# Patient Record
Sex: MALE | HISPANIC OR LATINO | Employment: FULL TIME | URBAN - METROPOLITAN AREA
[De-identification: names, ages, dates, MRNs, and addresses within clinical notes are randomized per-mention and may not be internally consistent; named-entity substitution may affect disease eponyms.]

---

## 2017-09-21 ENCOUNTER — OFFICE VISIT (OUTPATIENT)
Dept: URGENT CARE | Facility: PHYSICIAN GROUP | Age: 22
End: 2017-09-21
Payer: COMMERCIAL

## 2017-09-21 VITALS
BODY MASS INDEX: 25.27 KG/M2 | HEART RATE: 90 BPM | WEIGHT: 161 LBS | DIASTOLIC BLOOD PRESSURE: 56 MMHG | SYSTOLIC BLOOD PRESSURE: 100 MMHG | TEMPERATURE: 98.3 F | RESPIRATION RATE: 14 BRPM | OXYGEN SATURATION: 95 % | HEIGHT: 67 IN

## 2017-09-21 DIAGNOSIS — R19.7 DIARRHEA, UNSPECIFIED TYPE: ICD-10-CM

## 2017-09-21 DIAGNOSIS — K64.9 HEMORRHOIDS, UNSPECIFIED HEMORRHOID TYPE: ICD-10-CM

## 2017-09-21 PROCEDURE — 99203 OFFICE O/P NEW LOW 30 MIN: CPT | Performed by: FAMILY MEDICINE

## 2017-09-21 RX ORDER — ONDANSETRON 4 MG/1
4 TABLET, FILM COATED ORAL EVERY 4 HOURS PRN
Qty: 20 TAB | Refills: 0 | Status: SHIPPED | OUTPATIENT
Start: 2017-09-21 | End: 2019-06-25

## 2017-09-21 RX ORDER — ONDANSETRON 4 MG/1
4 TABLET, ORALLY DISINTEGRATING ORAL ONCE
Status: COMPLETED | OUTPATIENT
Start: 2017-09-21 | End: 2017-09-21

## 2017-09-21 RX ADMIN — ONDANSETRON 4 MG: 4 TABLET, ORALLY DISINTEGRATING ORAL at 10:11

## 2017-09-21 ASSESSMENT — ENCOUNTER SYMPTOMS
VOMITING: 0
EYE DISCHARGE: 0
BLOOD IN STOOL: 0
HEADACHES: 0
SHORTNESS OF BREATH: 0
CHILLS: 0
ABDOMINAL PAIN: 1
CONSTIPATION: 0
SORE THROAT: 0
WEIGHT LOSS: 0
COUGH: 0
SENSORY CHANGE: 0
WHEEZING: 0
FEVER: 0
NAUSEA: 1
PALPITATIONS: 0
DIARRHEA: 1

## 2017-09-21 NOTE — PATIENT INSTRUCTIONS
Hemorrhoids  Hemorrhoids are swollen veins around the rectum or anus. There are two types of hemorrhoids:   · Internal hemorrhoids. These occur in the veins just inside the rectum. They may poke through to the outside and become irritated and painful.  · External hemorrhoids. These occur in the veins outside the anus and can be felt as a painful swelling or hard lump near the anus.  CAUSES  · Pregnancy.    · Obesity.    · Constipation or diarrhea.    · Straining to have a bowel movement.    · Sitting for long periods on the toilet.  · Heavy lifting or other activity that caused you to strain.  · Anal intercourse.  SYMPTOMS   · Pain.    · Anal itching or irritation.    · Rectal bleeding.    · Fecal leakage.    · Anal swelling.    · One or more lumps around the anus.    DIAGNOSIS   Your caregiver may be able to diagnose hemorrhoids by visual examination. Other examinations or tests that may be performed include:   · Examination of the rectal area with a gloved hand (digital rectal exam).    · Examination of anal canal using a small tube (scope).    · A blood test if you have lost a significant amount of blood.  · A test to look inside the colon (sigmoidoscopy or colonoscopy).  TREATMENT  Most hemorrhoids can be treated at home. However, if symptoms do not seem to be getting better or if you have a lot of rectal bleeding, your caregiver may perform a procedure to help make the hemorrhoids get smaller or remove them completely. Possible treatments include:   · Placing a rubber band at the base of the hemorrhoid to cut off the circulation (rubber band ligation).    · Injecting a chemical to shrink the hemorrhoid (sclerotherapy).    · Using a tool to burn the hemorrhoid (infrared light therapy).    · Surgically removing the hemorrhoid (hemorrhoidectomy).    · Stapling the hemorrhoid to block blood flow to the tissue (hemorrhoid stapling).    HOME CARE INSTRUCTIONS   · Eat foods with fiber, such as whole grains, beans,  nuts, fruits, and vegetables. Ask your doctor about taking products with added fiber in them (fiber supplements).  · Increase fluid intake. Drink enough water and fluids to keep your urine clear or pale yellow.    · Exercise regularly.    · Go to the bathroom when you have the urge to have a bowel movement. Do not wait.    · Avoid straining to have bowel movements.    · Keep the anal area dry and clean. Use wet toilet paper or moist towelettes after a bowel movement.    · Medicated creams and suppositories may be used or applied as directed.    · Only take over-the-counter or prescription medicines as directed by your caregiver.    · Take warm sitz baths for 15-20 minutes, 3-4 times a day to ease pain and discomfort.    · Place ice packs on the hemorrhoids if they are tender and swollen. Using ice packs between sitz baths may be helpful.    ¨ Put ice in a plastic bag.    ¨ Place a towel between your skin and the bag.    ¨ Leave the ice on for 15-20 minutes, 3-4 times a day.    · Do not use a donut-shaped pillow or sit on the toilet for long periods. This increases blood pooling and pain.    SEEK MEDICAL CARE IF:  · You have increasing pain and swelling that is not controlled by treatment or medicine.  · You have uncontrolled bleeding.  · You have difficulty or you are unable to have a bowel movement.  · You have pain or inflammation outside the area of the hemorrhoids.  MAKE SURE YOU:  · Understand these instructions.  · Will watch your condition.  · Will get help right away if you are not doing well or get worse.     This information is not intended to replace advice given to you by your health care provider. Make sure you discuss any questions you have with your health care provider.     Document Released: 12/15/2001 Document Revised: 12/04/2013 Document Reviewed: 10/22/2013  [employer] Patient Education ©2016 [employer]    1- H-preparation cream    Diarrhea  Diarrhea is frequent loose and watery  bowel movements. It can cause you to feel weak and dehydrated. Dehydration can cause you to become tired and thirsty, have a dry mouth, and have decreased urination that often is dark yellow. Diarrhea is a sign of another problem, most often an infection that will not last long. In most cases, diarrhea typically lasts 2-3 days. However, it can last longer if it is a sign of something more serious. It is important to treat your diarrhea as directed by your caregiver to lessen or prevent future episodes of diarrhea.  CAUSES   Some common causes include:  · Gastrointestinal infections caused by viruses, bacteria, or parasites.  · Food poisoning or food allergies.  · Certain medicines, such as antibiotics, chemotherapy, and laxatives.  · Artificial sweeteners and fructose.  · Digestive disorders.  HOME CARE INSTRUCTIONS  · Ensure adequate fluid intake (hydration): Have 1 cup (8 oz) of fluid for each diarrhea episode. Avoid fluids that contain simple sugars or sports drinks, fruit juices, whole milk products, and sodas. Your urine should be clear or pale yellow if you are drinking enough fluids. Hydrate with an oral rehydration solution that you can purchase at pharmacies, retail stores, and online. You can prepare an oral rehydration solution at home by mixing the following ingredients together:  ¨  - tsp table salt.  ¨ ¾ tsp baking soda.  ¨  tsp salt substitute containing potassium chloride.  ¨ 1  tablespoons sugar.  ¨ 1 L (34 oz) of water.  · Certain foods and beverages may increase the speed at which food moves through the gastrointestinal (GI) tract. These foods and beverages should be avoided and include:  ¨ Caffeinated and alcoholic beverages.  ¨ High-fiber foods, such as raw fruits and vegetables, nuts, seeds, and whole grain breads and cereals.  ¨ Foods and beverages sweetened with sugar alcohols, such as xylitol, sorbitol, and mannitol.  · Some foods may be well tolerated and may help thicken stool  including:  ¨ Starchy foods, such as rice, toast, pasta, low-sugar cereal, oatmeal, grits, baked potatoes, crackers, and bagels.  ¨ Bananas.  ¨ Applesauce.  · Add probiotic-rich foods to help increase healthy bacteria in the GI tract, such as yogurt and fermented milk products.  · Wash your hands well after each diarrhea episode.  · Only take over-the-counter or prescription medicines as directed by your caregiver.  · Take a warm bath to relieve any burning or pain from frequent diarrhea episodes.  SEEK IMMEDIATE MEDICAL CARE IF:   · You are unable to keep fluids down.  · You have persistent vomiting.  · You have blood in your stool, or your stools are black and tarry.  · You do not urinate in 6-8 hours, or there is only a small amount of very dark urine.  · You have abdominal pain that increases or localizes.  · You have weakness, dizziness, confusion, or light-headedness.  · You have a severe headache.  · Your diarrhea gets worse or does not get better.  · You have a fever or persistent symptoms for more than 2-3 days.  · You have a fever and your symptoms suddenly get worse.  MAKE SURE YOU:   · Understand these instructions.  · Will watch your condition.  · Will get help right away if you are not doing well or get worse.     This information is not intended to replace advice given to you by your health care provider. Make sure you discuss any questions you have with your health care provider.     Document Released: 12/08/2003 Document Revised: 01/08/2016 Document Reviewed: 08/25/2013  [employer] Interactive Patient Education ©2016 [employer]

## 2017-10-02 ENCOUNTER — OFFICE VISIT (OUTPATIENT)
Dept: MEDICAL GROUP | Facility: PHYSICIAN GROUP | Age: 22
End: 2017-10-02
Payer: COMMERCIAL

## 2017-10-02 VITALS
TEMPERATURE: 98.4 F | SYSTOLIC BLOOD PRESSURE: 110 MMHG | HEART RATE: 68 BPM | HEIGHT: 70 IN | RESPIRATION RATE: 16 BRPM | OXYGEN SATURATION: 98 % | WEIGHT: 159 LBS | BODY MASS INDEX: 22.76 KG/M2 | DIASTOLIC BLOOD PRESSURE: 70 MMHG

## 2017-10-02 DIAGNOSIS — R19.7 DIARRHEA, UNSPECIFIED TYPE: ICD-10-CM

## 2017-10-02 DIAGNOSIS — Z76.89 ENCOUNTER TO ESTABLISH CARE WITH NEW DOCTOR: ICD-10-CM

## 2017-10-02 PROBLEM — K64.9 HEMORRHOIDS: Status: RESOLVED | Noted: 2017-09-21 | Resolved: 2017-10-02

## 2017-10-02 PROCEDURE — 99214 OFFICE O/P EST MOD 30 MIN: CPT | Performed by: NURSE PRACTITIONER

## 2017-10-02 ASSESSMENT — PATIENT HEALTH QUESTIONNAIRE - PHQ9: CLINICAL INTERPRETATION OF PHQ2 SCORE: 0

## 2017-10-02 NOTE — ASSESSMENT & PLAN NOTE
Was seen in urgent care last week for diarrhea.  Reports that it is an issue that comes and goes every once in a while.  He hasn't been able to relate it to a specific trigger.  This episode has resolved completely, and he was encouraged to reach out if it recurs.

## 2017-10-02 NOTE — PROGRESS NOTES
Chief Complaint   Patient presents with   • Establish Care       HISTORY OF PRESENT ILLNESS: Patient is a 21 y.o. male new patient who presents today to discuss the following issues:    Encounter to establish care with new doctor  Is here to establish with a new primary care provider.  Was previously seen by Cheli Palafox.       Diarrhea  Was seen in urgent care last week for diarrhea.  Reports that it is an issue that comes and goes every once in a while.  He hasn't been able to relate it to a specific trigger.  This episode has resolved completely, and he was encouraged to reach out if it recurs.      Patient Active Problem List    Diagnosis Date Noted   • Encounter to establish care with new doctor 10/02/2017   • Diarrhea 09/21/2017       Allergies:Review of patient's allergies indicates no known allergies.    Current Outpatient Prescriptions   Medication Sig Dispense Refill   • ondansetron (ZOFRAN) 4 MG Tab tablet Take 1 Tab by mouth every four hours as needed for Nausea/Vomiting. 20 Tab 0     No current facility-administered medications for this visit.        Social History   Substance Use Topics   • Smoking status: Never Smoker   • Smokeless tobacco: Never Used   • Alcohol use Yes      Comment: rarely       Family Status   Relation Status   • Mother Alive   • Father Alive   • Neg Hx      Family History   Problem Relation Age of Onset   • Other Mother      heart murmur   • Cancer Neg Hx    • Diabetes Neg Hx    • Heart Attack Neg Hx    • Heart Disease Neg Hx    • Stroke Neg Hx        Review of Systems:   Constitutional: Negative for fever, chills, weight loss and malaise/fatigue.   HENT: Negative for ear pain, nosebleeds, congestion, sore throat and neck pain.    Eyes: Negative for blurred vision.   Respiratory: Negative for cough, sputum production, shortness of breath and wheezing.    Cardiovascular: Negative for chest pain, palpitations, orthopnea and leg swelling.   Gastrointestinal: Negative for  "heartburn, nausea, vomiting and abdominal pain.   Genitourinary: Negative for dysuria, urgency and frequency.   Musculoskeletal: Negative for myalgias, joint pain, and back pain.  Skin: Negative for rash and itching.   Neurological: Negative for dizziness, tingling, tremors, sensory change, focal weakness and headaches.   Endo/Heme/Allergies: Does not bruise/bleed easily.   Psychiatric/Behavioral: Negative for depression, suicidal ideas and memory loss.  The patient is not nervous/anxious and does not have insomnia.    All other systems reviewed and are negative except as in HPI.    Exam:  Blood pressure 110/70, pulse 68, temperature 36.9 °C (98.4 °F), resp. rate 16, height 1.778 m (5' 10\"), weight 72.1 kg (159 lb), SpO2 98 %.  General:  Well nourished, well developed male in NAD  Head: Grossly normal.  Neck: Supple without JVD or bruit. Thyroid is not enlarged.  Pulmonary: Clear to ausculation. Normal effort. No rales, ronchi, or wheezing.  Cardiovascular: Regular rate and rhythm without murmur.   Extremities: No clubbing, cyanosis, or edema.  Skin: Intact with no obvious rashes or lesions.  Neuro: Grossly intact.  Psych: Alert and oriented x 3.  Mood and affect appropriate.    Medical decision-making and discussion: Malachi is here to establish with a new primary care provider.  We reviewed his past medical history and discussed his current medications.  He will monitor symptoms, and he will consider getting a flu shot after his trip to [place]. He will sign a records release for his previous provider, he will sign up with [facility], and he will plan to follow-up here as needed.        Assessment/Plan:  1. Encounter to establish care with new doctor     2. Diarrhea, unspecified type         Return if symptoms worsen or fail to improve.    Please note that this dictation was created using voice recognition software. I have made every reasonable attempt to correct obvious errors, but I expect that there are errors " of grammar and possibly content that I did not discover before finalizing the note.

## 2019-06-25 ENCOUNTER — OFFICE VISIT (OUTPATIENT)
Dept: MEDICAL GROUP | Facility: PHYSICIAN GROUP | Age: 24
End: 2019-06-25
Payer: COMMERCIAL

## 2019-06-25 ENCOUNTER — HOSPITAL ENCOUNTER (OUTPATIENT)
Dept: LAB | Facility: MEDICAL CENTER | Age: 24
End: 2019-06-25
Attending: NURSE PRACTITIONER
Payer: COMMERCIAL

## 2019-06-25 VITALS
WEIGHT: 160 LBS | TEMPERATURE: 98.4 F | HEIGHT: 67 IN | SYSTOLIC BLOOD PRESSURE: 110 MMHG | HEART RATE: 57 BPM | OXYGEN SATURATION: 97 % | DIASTOLIC BLOOD PRESSURE: 72 MMHG | RESPIRATION RATE: 16 BRPM | BODY MASS INDEX: 25.11 KG/M2

## 2019-06-25 DIAGNOSIS — F43.21 GRIEF: ICD-10-CM

## 2019-06-25 DIAGNOSIS — A64 STI (SEXUALLY TRANSMITTED INFECTION): ICD-10-CM

## 2019-06-25 DIAGNOSIS — Z00.00 ENCOUNTER FOR WELLNESS EXAMINATION: ICD-10-CM

## 2019-06-25 DIAGNOSIS — Z23 NEED FOR VACCINATION: ICD-10-CM

## 2019-06-25 PROBLEM — R19.7 DIARRHEA: Status: RESOLVED | Noted: 2017-09-21 | Resolved: 2019-06-25

## 2019-06-25 PROBLEM — Z76.89 ENCOUNTER TO ESTABLISH CARE WITH NEW DOCTOR: Status: RESOLVED | Noted: 2017-10-02 | Resolved: 2019-06-25

## 2019-06-25 LAB
25(OH)D3 SERPL-MCNC: 18 NG/ML (ref 30–100)
ALBUMIN SERPL BCP-MCNC: 4.5 G/DL (ref 3.2–4.9)
ALBUMIN/GLOB SERPL: 1.7 G/DL
ALP SERPL-CCNC: 67 U/L (ref 30–99)
ALT SERPL-CCNC: 19 U/L (ref 2–50)
ANION GAP SERPL CALC-SCNC: 8 MMOL/L (ref 0–11.9)
AST SERPL-CCNC: 21 U/L (ref 12–45)
BASOPHILS # BLD AUTO: 0.6 % (ref 0–1.8)
BASOPHILS # BLD: 0.04 K/UL (ref 0–0.12)
BILIRUB SERPL-MCNC: 0.7 MG/DL (ref 0.1–1.5)
BUN SERPL-MCNC: 14 MG/DL (ref 8–22)
C TRACH DNA SPEC QL NAA+PROBE: NEGATIVE
CALCIUM SERPL-MCNC: 9.7 MG/DL (ref 8.5–10.5)
CHLORIDE SERPL-SCNC: 106 MMOL/L (ref 96–112)
CHOLEST SERPL-MCNC: 140 MG/DL (ref 100–199)
CO2 SERPL-SCNC: 23 MMOL/L (ref 20–33)
CREAT SERPL-MCNC: 0.91 MG/DL (ref 0.5–1.4)
EOSINOPHIL # BLD AUTO: 0.17 K/UL (ref 0–0.51)
EOSINOPHIL NFR BLD: 2.7 % (ref 0–6.9)
ERYTHROCYTE [DISTWIDTH] IN BLOOD BY AUTOMATED COUNT: 43.1 FL (ref 35.9–50)
FASTING STATUS PATIENT QL REPORTED: NORMAL
GLOBULIN SER CALC-MCNC: 2.6 G/DL (ref 1.9–3.5)
GLUCOSE SERPL-MCNC: 98 MG/DL (ref 65–99)
HBV SURFACE AG SER QL: NEGATIVE
HCT VFR BLD AUTO: 45.1 % (ref 42–52)
HDLC SERPL-MCNC: 42 MG/DL
HGB BLD-MCNC: 15.4 G/DL (ref 14–18)
HIV 1+2 AB+HIV1 P24 AG SERPL QL IA: NON REACTIVE
IMM GRANULOCYTES # BLD AUTO: 0.02 K/UL (ref 0–0.11)
IMM GRANULOCYTES NFR BLD AUTO: 0.3 % (ref 0–0.9)
LDLC SERPL CALC-MCNC: 71 MG/DL
LYMPHOCYTES # BLD AUTO: 1.71 K/UL (ref 1–4.8)
LYMPHOCYTES NFR BLD: 26.8 % (ref 22–41)
MCH RBC QN AUTO: 31.2 PG (ref 27–33)
MCHC RBC AUTO-ENTMCNC: 34.1 G/DL (ref 33.7–35.3)
MCV RBC AUTO: 91.5 FL (ref 81.4–97.8)
MONOCYTES # BLD AUTO: 0.52 K/UL (ref 0–0.85)
MONOCYTES NFR BLD AUTO: 8.1 % (ref 0–13.4)
N GONORRHOEA DNA SPEC QL NAA+PROBE: NEGATIVE
NEUTROPHILS # BLD AUTO: 3.93 K/UL (ref 1.82–7.42)
NEUTROPHILS NFR BLD: 61.5 % (ref 44–72)
NRBC # BLD AUTO: 0 K/UL
NRBC BLD-RTO: 0 /100 WBC
PLATELET # BLD AUTO: 290 K/UL (ref 164–446)
PMV BLD AUTO: 9.5 FL (ref 9–12.9)
POTASSIUM SERPL-SCNC: 4 MMOL/L (ref 3.6–5.5)
PROT SERPL-MCNC: 7.1 G/DL (ref 6–8.2)
RBC # BLD AUTO: 4.93 M/UL (ref 4.7–6.1)
SODIUM SERPL-SCNC: 137 MMOL/L (ref 135–145)
SPECIMEN SOURCE: NORMAL
TREPONEMA PALLIDUM IGG+IGM AB [PRESENCE] IN SERUM OR PLASMA BY IMMUNOASSAY: NON REACTIVE
TRIGL SERPL-MCNC: 135 MG/DL (ref 0–149)
WBC # BLD AUTO: 6.4 K/UL (ref 4.8–10.8)

## 2019-06-25 PROCEDURE — 86780 TREPONEMA PALLIDUM: CPT

## 2019-06-25 PROCEDURE — 87591 N.GONORRHOEAE DNA AMP PROB: CPT

## 2019-06-25 PROCEDURE — 80061 LIPID PANEL: CPT

## 2019-06-25 PROCEDURE — 85025 COMPLETE CBC W/AUTO DIFF WBC: CPT

## 2019-06-25 PROCEDURE — 36415 COLL VENOUS BLD VENIPUNCTURE: CPT

## 2019-06-25 PROCEDURE — 82306 VITAMIN D 25 HYDROXY: CPT

## 2019-06-25 PROCEDURE — 99395 PREV VISIT EST AGE 18-39: CPT | Mod: 25 | Performed by: NURSE PRACTITIONER

## 2019-06-25 PROCEDURE — 80053 COMPREHEN METABOLIC PANEL: CPT

## 2019-06-25 PROCEDURE — 90715 TDAP VACCINE 7 YRS/> IM: CPT | Performed by: NURSE PRACTITIONER

## 2019-06-25 PROCEDURE — 87340 HEPATITIS B SURFACE AG IA: CPT

## 2019-06-25 PROCEDURE — 87491 CHLMYD TRACH DNA AMP PROBE: CPT

## 2019-06-25 PROCEDURE — 87389 HIV-1 AG W/HIV-1&-2 AB AG IA: CPT

## 2019-06-25 PROCEDURE — 90471 IMMUNIZATION ADMIN: CPT | Performed by: NURSE PRACTITIONER

## 2019-06-25 ASSESSMENT — PATIENT HEALTH QUESTIONNAIRE - PHQ9: CLINICAL INTERPRETATION OF PHQ2 SCORE: 0

## 2019-06-25 NOTE — PROGRESS NOTES
Chief Complaint   Patient presents with   • Annual Exam       HISTORY OF PRESENT ILLNESS: Patient is a 23 y.o. male established patient who presents today to discuss the following issues:    Encounter for wellness examination  Here for his annual check-up.  Has no current issues.  Would like labs.    STI (sexually transmitted infection)  Would like an STI check.    Need for vaccination  Due for Tdap.    Grief  Would like a referral to counseling.  Has had a few deaths in his family.  Denies depression, SI/HI.        Patient Active Problem List    Diagnosis Date Noted   • Need for vaccination 06/25/2019   • Encounter for wellness examination 06/25/2019   • STI (sexually transmitted infection) 06/25/2019   • Grief 06/25/2019       Allergies:Patient has no known allergies.    No current outpatient prescriptions on file.     No current facility-administered medications for this visit.        Social History   Substance Use Topics   • Smoking status: Never Smoker   • Smokeless tobacco: Never Used   • Alcohol use Yes      Comment: 2-3 per week (Beer)        Family Status   Relation Status   • Mo Alive   • Fa Alive   • Neg Hx (Not Specified)     Family History   Problem Relation Age of Onset   • Other Mother         heart murmur   • Cancer Neg Hx    • Diabetes Neg Hx    • Heart Attack Neg Hx    • Heart Disease Neg Hx    • Stroke Neg Hx        Review of Systems:   Constitutional: Negative for fever, chills, weight loss and malaise/fatigue.   HENT: Negative for ear pain, nosebleeds, congestion, sore throat and neck pain.    Eyes: Negative for blurred vision.   Respiratory: Negative for cough, sputum production, shortness of breath and wheezing.    Cardiovascular: Negative for chest pain, palpitations, orthopnea and leg swelling.   Gastrointestinal: Negative for heartburn, nausea, vomiting and abdominal pain.   Genitourinary: Negative for dysuria, urgency and frequency.   Musculoskeletal: Negative for myalgias, joint pain, and  "back pain.  Skin: Negative for rash and itching.   Neurological: Negative for dizziness, tingling, tremors, sensory change, focal weakness and headaches.   Endo/Heme/Allergies: Does not bruise/bleed easily.   Psychiatric/Behavioral: Negative for depression, suicidal ideas and memory loss.  The patient is not nervous/anxious and does not have insomnia.  Positive for situational frief.  All other systems reviewed and are negative except as in HPI.    Exam:  /72   Pulse (!) 57   Temp 36.9 °C (98.4 °F)   Resp 16   Ht 1.702 m (5' 7\")   Wt 72.6 kg (160 lb)   SpO2 97%   General:  Well nourished, well developed male in NAD  Head: Grossly normal.  Neck: Supple without JVD or bruit. Thyroid is not enlarged.  Pulmonary: Clear to ausculation. Normal effort. No rales, ronchi, or wheezing.  Cardiovascular: Regular rate and rhythm without murmur.   Abdomen:  Soft, nontender, nondistended.  Extremities: No clubbing, cyanosis, or edema.  Skin: Intact with no obvious rashes or lesions.  Neuro: Grossly intact.  Psych: Alert and oriented x 3.  Mood and affect appropriate.    Medical decision-making and discussion: Malachi is here today to discuss wellness.  Lab work was ordered, a referral was sent to behavioral health, and he was given a Tdap.  He will follow-up here as needed.    I have placed the below orders and discussed them with an approved delegating provider. The MA is performing the below orders under the direction of Dr. Alexander, who have provided verbal consent for supervision.            Assessment/Plan:  1. Need for vaccination  TDAP VACCINE =>8YO IM   2. Encounter for wellness examination  Comp Metabolic Panel    Lipid Profile    VITAMIN D,25 HYDROXY    CBC WITH DIFFERENTIAL   3. STI (sexually transmitted infection)  CHLAMYDIA/GC AMP URINE OR SWAB    T.PALLIDUM AB EIA    HEP B SURFACE ANTIGEN    HIV AG/AB COMBO ASSAY SCREENING   4. Grief  REFERRAL TO BEHAVIORAL HEALTH       Return if symptoms worsen or fail to " improve.    Please note that this dictation was created using voice recognition software. I have made every reasonable attempt to correct obvious errors, but I expect that there are errors of grammar and possibly content that I did not discover before finalizing the note.

## 2019-06-25 NOTE — ASSESSMENT & PLAN NOTE
Would like a referral to counseling.  Has had a few deaths in his family.  Denies depression, SI/HI.

## 2019-06-26 ENCOUNTER — TELEPHONE (OUTPATIENT)
Dept: MEDICAL GROUP | Facility: PHYSICIAN GROUP | Age: 24
End: 2019-06-26

## 2019-06-26 NOTE — TELEPHONE ENCOUNTER
----- Message from Jesika Alexander M.D. sent at 6/26/2019  9:22 AM PDT -----  Normal labs, just a little low in vitamin D.  Recommend 5000 IU per day.

## 2019-11-04 ENCOUNTER — OFFICE VISIT (OUTPATIENT)
Dept: URGENT CARE | Facility: CLINIC | Age: 24
End: 2019-11-04
Payer: COMMERCIAL

## 2019-11-04 VITALS
HEART RATE: 114 BPM | BODY MASS INDEX: 25.74 KG/M2 | RESPIRATION RATE: 14 BRPM | OXYGEN SATURATION: 93 % | SYSTOLIC BLOOD PRESSURE: 104 MMHG | HEIGHT: 67 IN | DIASTOLIC BLOOD PRESSURE: 62 MMHG | WEIGHT: 164 LBS | TEMPERATURE: 100.8 F

## 2019-11-04 DIAGNOSIS — J03.90 EXUDATIVE TONSILLITIS: ICD-10-CM

## 2019-11-04 PROCEDURE — 87880 STREP A ASSAY W/OPTIC: CPT | Performed by: FAMILY MEDICINE

## 2019-11-04 PROCEDURE — 99214 OFFICE O/P EST MOD 30 MIN: CPT | Performed by: FAMILY MEDICINE

## 2019-11-04 NOTE — LETTER
[facility]  [address]  [place] [address]     November 5, 2019    Patient: Malcahi Carrion III   YOB: 1995   Date of Visit: 11/4/2019       To Whom It May Concern:    Malachi Carrion was seen and treated in our department on 11/5/2019. Please excuse his absence on 11/5/19.  He may return to work on 11/6/2019.    Sincerely,           Марина Ling, Med Ass't

## 2019-11-05 LAB
INT CON NEG: NEGATIVE
INT CON POS: POSITIVE
S PYO AG THROAT QL: NORMAL

## 2019-11-05 RX ORDER — AMOXICILLIN AND CLAVULANATE POTASSIUM 875; 125 MG/1; MG/1
1 TABLET, FILM COATED ORAL 2 TIMES DAILY
Qty: 20 TAB | Refills: 0 | Status: SHIPPED | OUTPATIENT
Start: 2019-11-05 | End: 2019-11-15

## 2019-11-05 NOTE — PROGRESS NOTES
"Subjective:     CC:  presents with Pharyngitis            Pharyngitis   This is a new problem. The current episode started in the past 7 days. The problem has been gradually worsening. There has been no fever. The pain is moderate. Associated symptoms include a hoarse voice, swollen glands and trouble swallowing. Pertinent negatives include no abdominal pain, congestion, coughing, diarrhea, headaches, shortness of breath or vomiting. no exposure to strep or mono. He has tried acetaminophen for the symptoms. The treatment provided mild relief.     Social History     Tobacco Use   • Smoking status: Never Smoker   • Smokeless tobacco: Never Used   Substance Use Topics   • Alcohol use: Yes     Comment: 2-3 per week (Beer)    • Drug use: No     No current outpatient medications on file prior to visit.     No current facility-administered medications on file prior to visit.      Family history was reviewed and not pertinent to current problem.       Review of Systems   Constitutional: Positive for malaise/fatigue. Negative for fever and weight loss.   HENT: Positive for hoarse voice and trouble swallowing. Negative for congestion.    Respiratory: Negative for cough, sputum production and shortness of breath.    Cardiovascular: Negative for chest pain.   Gastrointestinal: Negative for nausea, vomiting, abdominal pain and diarrhea.   Genitourinary: Negative.    Neurological: Negative for dizziness and headaches.   All other systems reviewed and are negative.         Objective:     /62   Pulse (!) 114   Temp (!) 38.2 °C (100.8 °F)   Resp 14   Ht 1.702 m (5' 7\")   Wt 74.4 kg (164 lb)   SpO2 93%       Physical Exam   Constitutional:   oriented to person, place, and time.  appears well-developed and well-nourished. No distress.   HENT:   Head: Normocephalic and atraumatic.   Right Ear: External ear normal.   Left Ear: External ear normal.   Nose: Mucosal edema present. Right sinus exhibits no maxillary sinus " tenderness and no frontal sinus tenderness. Left sinus exhibits no maxillary sinus tenderness and no frontal sinus tenderness.   Mouth/Throat: Oropharyngeal exudate and posterior oropharyngeal erythema present. No posterior oropharyngeal edema.   Tonsils 2+ bilaterally     Eyes: Conjunctivae and EOM are normal. Pupils are equal, round, and reactive to light. Right eye exhibits no discharge. Left eye exhibits no discharge. No scleral icterus.   Neck: Normal range of motion. Neck supple. No JVD present. No tracheal deviation present. No thyromegaly present.   Cardiovascular: Normal rate, regular rhythm, normal heart sounds and intact distal pulses.  Exam reveals no friction rub.    No murmur heard.  Pulmonary/Chest: Effort normal and breath sounds normal. No respiratory distress.   no wheezes.   no rales.    Musculoskeletal:  exhibits no edema.   Lymphadenopathy:     Positive Cervical adenopathy.   Neurological:   alert and oriented to person, place, and time.   Skin: Skin is warm and dry. No erythema.   Psychiatric:   normal mood and affect.   Nursing note and vitals reviewed.             Assessment/Plan:     1. Tonsillitis   rapid strep neg  - amoxicillin-clavulanate (AUGMENTIN) 875-125 MG Tab; Take 1 Tab by mouth 2 times a day for 10 days.  Dispense: 20 Tab; Refill: 0

## 2019-11-05 NOTE — PROGRESS NOTES
"Subjective:      Malachi Carrion III is a 23 y.o. male who presents with Pharyngitis (Body aches )            Pharyngitis          ROS       Objective:     /62   Pulse (!) 114   Temp (!) 38.2 °C (100.8 °F)   Resp 14   Ht 1.702 m (5' 7\")   Wt 74.4 kg (164 lb)   SpO2 93%   BMI 25.69 kg/m²      Physical Exam            Assessment/Plan:       1. Exudative tonsillitis     - POCT Rapid Strep A  - amoxicillin-clavulanate (AUGMENTIN) 875-125 MG Tab; Take 1 Tab by mouth 2 times a day for 10 days.  Dispense: 20 Tab; Refill: 0    "

## 2019-11-07 NOTE — PROGRESS NOTES
"Subjective:     CC:  presents with Pharyngitis            Pharyngitis   This is a new problem. The current episode started in the past 3 days. The problem has been gradually worsening. There has been no fever. The pain is moderate. Associated symptoms include a hoarse voice, swollen glands and trouble swallowing. Pertinent negatives include no abdominal pain, congestion, coughing, diarrhea, headaches, shortness of breath or vomiting. no exposure to strep or mono. He has tried acetaminophen for the symptoms. The treatment provided mild relief.     Social History     Tobacco Use   • Smoking status: Never Smoker   • Smokeless tobacco: Never Used   Substance Use Topics   • Alcohol use: Yes     Comment: 2-3 per week (Beer)    • Drug use: No     No current outpatient medications on file prior to visit.     No current facility-administered medications on file prior to visit.      Family history was reviewed and not pertinent to current problem.       Review of Systems   Constitutional: Positive for malaise/fatigue. Negative for fever and weight loss.   HENT: Positive for hoarse voice and trouble swallowing. Negative for congestion.    Respiratory: Negative for cough, sputum production and shortness of breath.    Cardiovascular: Negative for chest pain.   Gastrointestinal: Negative for nausea, vomiting, abdominal pain and diarrhea.   Genitourinary: Negative.    Neurological: Negative for dizziness and headaches.   All other systems reviewed and are negative.         Objective:     /62   Pulse (!) 114   Temp (!) 38.2 °C (100.8 °F)   Resp 14   Ht 1.702 m (5' 7\")   Wt 74.4 kg (164 lb)   SpO2 93%       Physical Exam   Constitutional:   oriented to person, place, and time.  appears well-developed and well-nourished. No distress.   HENT:   Head: Normocephalic and atraumatic.   Right Ear: External ear normal.   Left Ear: External ear normal.   Nose: Mucosal edema present. Right sinus exhibits no maxillary sinus " tenderness and no frontal sinus tenderness. Left sinus exhibits no maxillary sinus tenderness and no frontal sinus tenderness.   Mouth/Throat: Oropharyngeal exudate and posterior oropharyngeal erythema present. No posterior oropharyngeal edema.  No uvula deviation  Tonsils 3+ bilaterally     Eyes: Conjunctivae and EOM are normal. Pupils are equal, round, and reactive to light. Right eye exhibits no discharge. Left eye exhibits no discharge. No scleral icterus.   Neck: Normal range of motion. Neck supple. No JVD present. No tracheal deviation present. No thyromegaly present.   Cardiovascular: Normal rate, regular rhythm, normal heart sounds and intact distal pulses.  Exam reveals no friction rub.    No murmur heard.  Pulmonary/Chest: Effort normal and breath sounds normal. No respiratory distress.   no wheezes.   no rales.    Musculoskeletal:  exhibits no edema.   Lymphadenopathy:     Positive Cervical adenopathy.   Neurological:   alert and oriented to person, place, and time.   Skin: Skin is warm and dry. No erythema.   Psychiatric:   normal mood and affect.   Nursing note and vitals reviewed.           Rapid strep neg        Assessment/Plan:     1. Exudative Tonsillitis     - amoxicillin-clavulanate (AUGMENTIN) 875-125 MG Tab; Take 1 Tab by mouth 2 times a day for 10 days.  Dispense: 20 Tab; Refill: 0      Follow up in one week if no improvement

## 2019-12-26 ENCOUNTER — OFFICE VISIT (OUTPATIENT)
Dept: URGENT CARE | Facility: PHYSICIAN GROUP | Age: 24
End: 2019-12-26
Payer: COMMERCIAL

## 2019-12-26 ENCOUNTER — APPOINTMENT (OUTPATIENT)
Dept: RADIOLOGY | Facility: IMAGING CENTER | Age: 24
End: 2019-12-26
Attending: NURSE PRACTITIONER
Payer: COMMERCIAL

## 2019-12-26 VITALS
TEMPERATURE: 99.8 F | WEIGHT: 162 LBS | RESPIRATION RATE: 16 BRPM | HEART RATE: 84 BPM | OXYGEN SATURATION: 94 % | HEIGHT: 67 IN | BODY MASS INDEX: 25.43 KG/M2 | SYSTOLIC BLOOD PRESSURE: 110 MMHG | DIASTOLIC BLOOD PRESSURE: 74 MMHG

## 2019-12-26 DIAGNOSIS — K59.00 CONSTIPATION, UNSPECIFIED CONSTIPATION TYPE: ICD-10-CM

## 2019-12-26 DIAGNOSIS — K64.0 GRADE I HEMORRHOIDS: ICD-10-CM

## 2019-12-26 DIAGNOSIS — K92.1 BLOOD IN STOOL: ICD-10-CM

## 2019-12-26 DIAGNOSIS — R10.30 LOWER ABDOMINAL PAIN: ICD-10-CM

## 2019-12-26 PROCEDURE — 99214 OFFICE O/P EST MOD 30 MIN: CPT | Performed by: NURSE PRACTITIONER

## 2019-12-26 PROCEDURE — 74019 RADEX ABDOMEN 2 VIEWS: CPT | Mod: TC | Performed by: NURSE PRACTITIONER

## 2019-12-26 RX ORDER — HYDROCORTISONE ACETATE 25 MG/1
25 SUPPOSITORY RECTAL EVERY 12 HOURS
Qty: 28 SUPPOSITORY | Refills: 0 | Status: SHIPPED | OUTPATIENT
Start: 2019-12-26 | End: 2020-01-09

## 2019-12-26 NOTE — PROGRESS NOTES
Subjective:      Malachi Carrion III is a 24 y.o. male who presents with Bloody Stools (bloody stool x 2 days, painful when going to the bathroom, itchy, hx of hemorroids2 years ago)    History reviewed. No pertinent past medical history.  Social History     Socioeconomic History   • Marital status: Single     Spouse name: Not on file   • Number of children: Not on file   • Years of education: Not on file   • Highest education level: Not on file   Occupational History   • Not on file   Social Needs   • Financial resource strain: Not on file   • Food insecurity:     Worry: Not on file     Inability: Not on file   • Transportation needs:     Medical: Not on file     Non-medical: Not on file   Tobacco Use   • Smoking status: Never Smoker   • Smokeless tobacco: Never Used   Substance and Sexual Activity   • Alcohol use: Yes     Comment: 2-3 per week (Beer)    • Drug use: No   • Sexual activity: Yes     Partners: Female   Lifestyle   • Physical activity:     Days per week: Not on file     Minutes per session: Not on file   • Stress: Not on file   Relationships   • Social connections:     Talks on phone: Not on file     Gets together: Not on file     Attends Shinto service: Not on file     Active member of club or organization: Not on file     Attends meetings of clubs or organizations: Not on file     Relationship status: Not on file   • Intimate partner violence:     Fear of current or ex partner: Not on file     Emotionally abused: Not on file     Physically abused: Not on file     Forced sexual activity: Not on file   Other Topics Concern   • Not on file   Social History Narrative   • Not on file     Family History   Problem Relation Age of Onset   • Other Mother         heart murmur   • Cancer Neg Hx    • Diabetes Neg Hx    • Heart Attack Neg Hx    • Heart Disease Neg Hx    • Stroke Neg Hx        Allergies: Patient has no known allergies.    Patient is a 24-year-old male who presents today with complaint  "of intermittent lower abdominal pain and blood in his stool.  Symptoms started over the last week.  Positive prior history of hemorrhoids.  States his lower abdominal pain occurs mostly with trying to have bowel movements.  States he does have to strain at stool.  He does not think he has been constipated.  Patient does lift weights in the gym and sits for long periods of time at work.          Other   This is a new problem. The problem occurs constantly. The problem has been unchanged. Associated symptoms include abdominal pain. Nothing aggravates the symptoms. He has tried nothing for the symptoms. The treatment provided no relief.       Review of Systems   Gastrointestinal: Positive for abdominal pain and blood in stool.   All other systems reviewed and are negative.         Objective:     /74 (BP Location: Right arm, Patient Position: Sitting, BP Cuff Size: Adult)   Pulse 84   Temp 37.7 °C (99.8 °F) (Temporal)   Resp 16   Ht 1.702 m (5' 7\")   Wt 73.5 kg (162 lb)   SpO2 94%   BMI 25.37 kg/m²      Physical Exam  Vitals signs reviewed.   Constitutional:       Appearance: Normal appearance.   HENT:      Nose: Nose normal.   Neck:      Musculoskeletal: Normal range of motion and neck supple.   Cardiovascular:      Rate and Rhythm: Normal rate and regular rhythm.      Heart sounds: Normal heart sounds.   Pulmonary:      Effort: Pulmonary effort is normal.      Breath sounds: Normal breath sounds.   Abdominal:      General: Abdomen is flat.      Tenderness: There is tenderness. There is no guarding or rebound.      Comments: Mildly tender to the central lower abdomen, no guarding or rebound tenderness present   Genitourinary:     Rectum: Normal.      Comments: Rectal tone is firm, no external hemorrhoids noted.  Mild prominence noted on rectal exam, no fresh bleeding at this time.  Skin:     General: Skin is warm and dry.   Neurological:      Mental Status: He is alert.   Psychiatric:         Mood and " Affect: Mood normal.         Behavior: Behavior normal.         Thought Content: Thought content normal.         Judgment: Judgment normal.       XR abd:     FINDINGS:  Marked colonic gas and stool.  No significantly distended small bowel loops.  No free air under the diaphragm.     IMPRESSION:     Constipation pattern. No evidence small bowel obstruction.          Assessment/Plan:       1. Lower abdominal pain  2. Blood in stool  3. Hemorrhoids    anusol-HC suppositories  Increase fluids  Add miralax  Strict ERprecautions for escalating abdominal pain  Follow up also with primary doctor.

## 2019-12-31 NOTE — PROGRESS NOTES
Subjective:      Malachi Carrion III is a 24 y.o. male who presents with Bloody Stools (bloody stool x 2 days, painful when going to the bathroom, itchy, hx of hemorroids2 years ago)    History reviewed. No pertinent past medical history.  Social History     Socioeconomic History   • Marital status: Single     Spouse name: Not on file   • Number of children: Not on file   • Years of education: Not on file   • Highest education level: Not on file   Occupational History   • Not on file   Social Needs   • Financial resource strain: Not on file   • Food insecurity:     Worry: Not on file     Inability: Not on file   • Transportation needs:     Medical: Not on file     Non-medical: Not on file   Tobacco Use   • Smoking status: Never Smoker   • Smokeless tobacco: Never Used   Substance and Sexual Activity   • Alcohol use: Yes     Comment: 2-3 per week (Beer)    • Drug use: No   • Sexual activity: Yes     Partners: Female   Lifestyle   • Physical activity:     Days per week: Not on file     Minutes per session: Not on file   • Stress: Not on file   Relationships   • Social connections:     Talks on phone: Not on file     Gets together: Not on file     Attends Scientology service: Not on file     Active member of club or organization: Not on file     Attends meetings of clubs or organizations: Not on file     Relationship status: Not on file   • Intimate partner violence:     Fear of current or ex partner: Not on file     Emotionally abused: Not on file     Physically abused: Not on file     Forced sexual activity: Not on file   Other Topics Concern   • Not on file   Social History Narrative   • Not on file     Family History   Problem Relation Age of Onset   • Other Mother         heart murmur   • Cancer Neg Hx    • Diabetes Neg Hx    • Heart Attack Neg Hx    • Heart Disease Neg Hx    • Stroke Neg Hx        Allergies: Patient has no known allergies.    Patient is a 24-year-old male who presents today with complaint  "of intermittent lower abdominal pain and blood in his stool.  Symptoms started over the last week.  Positive prior history of hemorrhoids.  States his lower abdominal pain occurs mostly with trying to have bowel movements.  States he does have to strain at stool.  He does not think he has been constipated.  Patient does lift weights in the gym and sits for long periods of time at work.          Other   This is a new problem. The problem occurs constantly. The problem has been unchanged. Associated symptoms include abdominal pain. Nothing aggravates the symptoms. He has tried nothing for the symptoms. The treatment provided no relief.       Review of Systems   Gastrointestinal: Positive for abdominal pain and blood in stool.   All other systems reviewed and are negative.         Objective:     /74 (BP Location: Right arm, Patient Position: Sitting, BP Cuff Size: Adult)   Pulse 84   Temp 37.7 °C (99.8 °F) (Temporal)   Resp 16   Ht 1.702 m (5' 7\")   Wt 73.5 kg (162 lb)   SpO2 94%   BMI 25.37 kg/m²      Physical Exam  Vitals signs reviewed.   Constitutional:       Appearance: Normal appearance.   HENT:      Nose: Nose normal.   Neck:      Musculoskeletal: Normal range of motion and neck supple.   Cardiovascular:      Rate and Rhythm: Normal rate and regular rhythm.      Heart sounds: Normal heart sounds.   Pulmonary:      Effort: Pulmonary effort is normal.      Breath sounds: Normal breath sounds.   Abdominal:      General: Abdomen is flat.      Tenderness: There is tenderness. There is no guarding or rebound.      Comments: Mildly tender to the central lower abdomen, no guarding or rebound tenderness present   Genitourinary:     Rectum: Normal.      Comments: Rectal tone is firm, no external hemorrhoids noted.  Mild prominence noted on rectal exam, no fresh bleeding at this time.  Skin:     General: Skin is warm and dry.   Neurological:      Mental Status: He is alert.   Psychiatric:         Mood and " Affect: Mood normal.         Behavior: Behavior normal.         Thought Content: Thought content normal.         Judgment: Judgment normal.       XR abd:     12/26/2019 11:39 AM     HISTORY/REASON FOR EXAM:  Abdominal Pain; possible constipation.        TECHNIQUE/EXAM DESCRIPTION AND NUMBER OF VIEWS:  3 views of the abdomen.     COMPARISON: None     FINDINGS:  Marked colonic gas and stool.  No significantly distended small bowel loops.  No free air under the diaphragm.     IMPRESSION:     Constipation pattern. No evidence small bowel obstruction.          Assessment/Plan:       1. Lower abdominal pain  2. Blood in stool  3. Hemorrhoids    anusol-HC suppositories  Increase fluids  Add miralax  Strict ERprecautions for escalating abdominal pain  Follow up also with primary doctor.

## 2020-01-07 ENCOUNTER — OFFICE VISIT (OUTPATIENT)
Dept: MEDICAL GROUP | Facility: PHYSICIAN GROUP | Age: 25
End: 2020-01-07
Payer: COMMERCIAL

## 2020-01-07 VITALS
RESPIRATION RATE: 16 BRPM | WEIGHT: 166 LBS | OXYGEN SATURATION: 97 % | HEIGHT: 67 IN | DIASTOLIC BLOOD PRESSURE: 62 MMHG | HEART RATE: 80 BPM | BODY MASS INDEX: 26.06 KG/M2 | SYSTOLIC BLOOD PRESSURE: 112 MMHG | TEMPERATURE: 97.7 F

## 2020-01-07 DIAGNOSIS — K64.0 GRADE I HEMORRHOIDS: ICD-10-CM

## 2020-01-07 DIAGNOSIS — Z23 NEED FOR VACCINATION: ICD-10-CM

## 2020-01-07 PROCEDURE — 90686 IIV4 VACC NO PRSV 0.5 ML IM: CPT | Performed by: NURSE PRACTITIONER

## 2020-01-07 PROCEDURE — 90471 IMMUNIZATION ADMIN: CPT | Performed by: NURSE PRACTITIONER

## 2020-01-07 PROCEDURE — 99214 OFFICE O/P EST MOD 30 MIN: CPT | Mod: 25 | Performed by: NURSE PRACTITIONER

## 2020-01-07 RX ORDER — HYDROCORTISONE ACETATE 25 MG/1
25 SUPPOSITORY RECTAL EVERY 12 HOURS
Qty: 28 SUPPOSITORY | Refills: 3 | Status: SHIPPED | OUTPATIENT
Start: 2020-01-07 | End: 2022-05-25

## 2020-01-07 ASSESSMENT — PATIENT HEALTH QUESTIONNAIRE - PHQ9: CLINICAL INTERPRETATION OF PHQ2 SCORE: 0

## 2020-01-07 NOTE — PROGRESS NOTES
Chief Complaint   Patient presents with   • Follow-Up       HISTORY OF PRESENT ILLNESS: Patient is a 24 y.o. male established patient who presents today to discuss the following issues:    Grade I hemorrhoids  Was seen in urgent care and diagnosed with hemorrhoids.  Was prescribed suppositories that were helpful, but they were not covered by his insurance.  We will attempt to get a prior auth.    Need for vaccination  Would like a flu shot today.        Patient Active Problem List    Diagnosis Date Noted   • Grade I hemorrhoids 01/22/2020   • Need for vaccination 06/25/2019   • Encounter for wellness examination 06/25/2019   • STI (sexually transmitted infection) 06/25/2019   • Grief 06/25/2019       Allergies:Patient has no known allergies.    Current Outpatient Medications   Medication Sig Dispense Refill   • hydrocortisone (ANUSOL-HC) 25 MG Suppos Insert 1 Suppository in rectum every 12 hours. 28 Suppository 3     No current facility-administered medications for this visit.        Social History     Tobacco Use   • Smoking status: Never Smoker   • Smokeless tobacco: Never Used   Substance Use Topics   • Alcohol use: Yes     Comment: 2-3 per week (Beer)    • Drug use: No       Family Status   Relation Name Status   • Mo  Alive   • Fa  Alive   • Neg Hx  (Not Specified)     Family History   Problem Relation Age of Onset   • Other Mother         heart murmur   • Cancer Neg Hx    • Diabetes Neg Hx    • Heart Attack Neg Hx    • Heart Disease Neg Hx    • Stroke Neg Hx        Review of Systems:   Constitutional: Negative for fever, chills, weight loss and malaise/fatigue.   HENT: Negative for ear pain, nosebleeds, congestion, sore throat and neck pain.    Eyes: Negative for blurred vision.   Respiratory: Negative for cough, sputum production, shortness of breath and wheezing.    Cardiovascular: Negative for chest pain, palpitations, orthopnea and leg swelling.   Gastrointestinal: Negative for heartburn, nausea, vomiting  "and abdominal pain. Positive for hemorrhoids.  Genitourinary: Negative for dysuria, urgency and frequency.   Musculoskeletal: Negative for myalgias, joint pain, and back pain.  Skin: Negative for rash and itching.   Neurological: Negative for dizziness, tingling, tremors, sensory change, focal weakness and headaches.   Endo/Heme/Allergies: Does not bruise/bleed easily.   Psychiatric/Behavioral: Negative for depression, suicidal ideas and memory loss.  The patient is not nervous/anxious and does not have insomnia.    All other systems reviewed and are negative except as in HPI.    Exam:  Blood Pressure 112/62   Pulse 80   Temperature 36.5 °C (97.7 °F)   Respiration 16   Height 1.702 m (5' 7\")   Weight 75.3 kg (166 lb)   Oxygen Saturation 97%   General:  Well nourished, well developed male in NAD  Head: Grossly normal.  Neck: Supple without JVD or bruit. Thyroid is not enlarged.  Pulmonary: Clear to ausculation. Normal effort. No rales, ronchi, or wheezing.  Cardiovascular: Regular rate and rhythm without murmur.   Abdomen:  Soft, nontender, nondistended.  Extremities: No clubbing, cyanosis, or edema.  Skin: Intact with no obvious rashes or lesions.  Neuro: Grossly intact.  Psych: Alert and oriented x 3.  Mood and affect appropriate.    Medical decision-making and discussion: Malachi is here today to discuss a few things.  His records were reviewed, medication was sent to his pharmacy, and he was given a flu shot.  He will follow-up here as needed.    I have placed the below orders and discussed them with an approved delegating provider. The MA is performing the below orders under the direction of Dr. Alexander, who have provided verbal consent for supervision.            Assessment/Plan:  1. Need for vaccination  Influenza Vaccine Quad Injection (PF)   2. Grade I hemorrhoids  hydrocortisone (ANUSOL-HC) 25 MG Suppos       Return if symptoms worsen or fail to improve.    Please note that this dictation was created " using voice recognition software. I have made every reasonable attempt to correct obvious errors, but I expect that there are errors of grammar and possibly content that I did not discover before finalizing the note.

## 2020-01-22 PROBLEM — K64.0 GRADE I HEMORRHOIDS: Status: ACTIVE | Noted: 2020-01-22

## 2020-01-22 NOTE — ASSESSMENT & PLAN NOTE
Was seen in urgent care and diagnosed with hemorrhoids.  Was prescribed suppositories that were helpful, but they were not covered by his insurance.  We will attempt to get a prior auth.

## 2022-05-25 ENCOUNTER — OFFICE VISIT (OUTPATIENT)
Dept: URGENT CARE | Facility: CLINIC | Age: 27
End: 2022-05-25
Payer: COMMERCIAL

## 2022-05-25 VITALS
TEMPERATURE: 98 F | HEART RATE: 102 BPM | SYSTOLIC BLOOD PRESSURE: 130 MMHG | BODY MASS INDEX: 27.47 KG/M2 | OXYGEN SATURATION: 97 % | WEIGHT: 175 LBS | RESPIRATION RATE: 18 BRPM | DIASTOLIC BLOOD PRESSURE: 62 MMHG | HEIGHT: 67 IN

## 2022-05-25 DIAGNOSIS — J02.9 SORE THROAT: ICD-10-CM

## 2022-05-25 LAB
INT CON NEG: NORMAL
INT CON POS: NORMAL
S PYO AG THROAT QL: NEGATIVE

## 2022-05-25 PROCEDURE — 99213 OFFICE O/P EST LOW 20 MIN: CPT | Performed by: FAMILY MEDICINE

## 2022-05-25 PROCEDURE — 87880 STREP A ASSAY W/OPTIC: CPT | Performed by: FAMILY MEDICINE

## 2022-05-25 NOTE — PROGRESS NOTES
"  Subjective:      26 y.o. male presents to urgent care for sore throat that started yesterday. No headache, body aches, or diarrhea. He has been using cough drops which have only provided moderate relief in symptoms. He denies any tobacco product use. No history of asthma or COPD. He is fully vaccinated against COVID. No known sick contacts.    He denies any other questions or concerns at this time.    Current problem list, medication, and past medical/surgical history were reviewed in Epic.    ROS  See HPI     Objective:      /62   Pulse (!) 102   Temp 36.7 °C (98 °F) (Temporal)   Resp 18   Ht 1.702 m (5' 7\")   Wt 79.4 kg (175 lb)   SpO2 97%   BMI 27.41 kg/m²     Physical Exam  Constitutional:       General: He is not in acute distress.     Appearance: He is not diaphoretic.   HENT:      Right Ear: Tympanic membrane, ear canal and external ear normal.      Left Ear: Tympanic membrane, ear canal and external ear normal.      Nose:      Right Sinus: No maxillary sinus tenderness or frontal sinus tenderness.      Left Sinus: No maxillary sinus tenderness or frontal sinus tenderness.      Mouth/Throat:      Pharynx: Uvula midline. Posterior oropharyngeal erythema present.      Tonsils: No tonsillar exudate. 2+ on the right. 2+ on the left.   Cardiovascular:      Rate and Rhythm: Normal rate and regular rhythm.      Heart sounds: Normal heart sounds.   Pulmonary:      Effort: Pulmonary effort is normal. No respiratory distress.      Breath sounds: Normal breath sounds.   Neurological:      Mental Status: He is alert.   Psychiatric:         Mood and Affect: Affect normal.         Judgment: Judgment normal.       Assessment/Plan:     1. Sore throat  Rapid strep negative.  Likely viral etiology.  He was encouraged to use Tylenol, ibuprofen, gargle warm salt water as needed for symptomatic relief.  - POCT Rapid Strep A      Instructed to return to Urgent Care or nearest Emergency Department if symptoms fail to " improve, for any change in condition, further concerns, or new concerning symptoms. Patient states understanding of the plan of care and discharge instructions.    Yudi Pope M.D.

## 2022-07-07 ENCOUNTER — OFFICE VISIT (OUTPATIENT)
Dept: URGENT CARE | Facility: CLINIC | Age: 27
End: 2022-07-07
Payer: COMMERCIAL

## 2022-07-07 ENCOUNTER — HOSPITAL ENCOUNTER (OUTPATIENT)
Facility: MEDICAL CENTER | Age: 27
End: 2022-07-07
Attending: PHYSICIAN ASSISTANT
Payer: COMMERCIAL

## 2022-07-07 VITALS
HEIGHT: 67 IN | DIASTOLIC BLOOD PRESSURE: 78 MMHG | HEART RATE: 94 BPM | RESPIRATION RATE: 14 BRPM | OXYGEN SATURATION: 98 % | WEIGHT: 175 LBS | BODY MASS INDEX: 27.47 KG/M2 | TEMPERATURE: 97.9 F | SYSTOLIC BLOOD PRESSURE: 120 MMHG

## 2022-07-07 DIAGNOSIS — J06.9 URI WITH COUGH AND CONGESTION: ICD-10-CM

## 2022-07-07 DIAGNOSIS — J02.9 SORE THROAT: ICD-10-CM

## 2022-07-07 DIAGNOSIS — H10.33 ACUTE CONJUNCTIVITIS OF BOTH EYES, UNSPECIFIED ACUTE CONJUNCTIVITIS TYPE: ICD-10-CM

## 2022-07-07 LAB
EXTERNAL QUALITY CONTROL: NORMAL
INT CON NEG: NORMAL
INT CON POS: NORMAL
S PYO AG THROAT QL: NEGATIVE
SARS-COV+SARS-COV-2 AG RESP QL IA.RAPID: NEGATIVE

## 2022-07-07 PROCEDURE — U0005 INFEC AGEN DETEC AMPLI PROBE: HCPCS

## 2022-07-07 PROCEDURE — U0003 INFECTIOUS AGENT DETECTION BY NUCLEIC ACID (DNA OR RNA); SEVERE ACUTE RESPIRATORY SYNDROME CORONAVIRUS 2 (SARS-COV-2) (CORONAVIRUS DISEASE [COVID-19]), AMPLIFIED PROBE TECHNIQUE, MAKING USE OF HIGH THROUGHPUT TECHNOLOGIES AS DESCRIBED BY CMS-2020-01-R: HCPCS

## 2022-07-07 PROCEDURE — 87426 SARSCOV CORONAVIRUS AG IA: CPT | Performed by: PHYSICIAN ASSISTANT

## 2022-07-07 PROCEDURE — 99213 OFFICE O/P EST LOW 20 MIN: CPT | Performed by: PHYSICIAN ASSISTANT

## 2022-07-07 PROCEDURE — 87880 STREP A ASSAY W/OPTIC: CPT | Performed by: PHYSICIAN ASSISTANT

## 2022-07-07 RX ORDER — POLYMYXIN B SULFATE AND TRIMETHOPRIM 1; 10000 MG/ML; [USP'U]/ML
1 SOLUTION OPHTHALMIC 4 TIMES DAILY
Qty: 10 ML | Refills: 0 | Status: SHIPPED | OUTPATIENT
Start: 2022-07-07 | End: 2022-07-14

## 2022-07-07 RX ORDER — BENZONATATE 100 MG/1
100 CAPSULE ORAL 3 TIMES DAILY PRN
Qty: 60 CAPSULE | Refills: 0 | Status: SHIPPED | OUTPATIENT
Start: 2022-07-07 | End: 2022-07-28

## 2022-07-07 ASSESSMENT — ENCOUNTER SYMPTOMS
EYE DISCHARGE: 1
FEVER: 1
SHORTNESS OF BREATH: 0
RHINORRHEA: 1
MYALGIAS: 0
HEADACHES: 1
VOMITING: 0
NAUSEA: 0
SORE THROAT: 1
EYE REDNESS: 1
DIARRHEA: 0
COUGH: 1

## 2022-07-07 ASSESSMENT — VISUAL ACUITY: OU: 1

## 2022-07-07 NOTE — PROGRESS NOTES
"Gris Carrion III is a 26 y.o. male who presents with Cough (X 4 days, cough, sore throat, runny nose, headache, red eyes.)            URI   This is a new problem. Episode onset: x 4 days ago. The problem has been unchanged. Maximum temperature: The patient reports a subjective fever. Associated symptoms include congestion, coughing, headaches, rhinorrhea and a sore throat. Pertinent negatives include no chest pain, diarrhea, ear pain, nausea or vomiting. Treatments tried: OTC DayQuil and NyQuil.     The patient reports no recent sick contacts.  He reports no known exposure to COVID-19.     PMH:  has no past medical history of Depression, Diabetes, Hyperlipidemia, or Hypertension.  MEDS: No current outpatient medications on file.  ALLERGIES: No Known Allergies  SURGHX:   Past Surgical History:   Procedure Laterality Date   • APPENDECTOMY LAPAROSCOPIC  1/9/2014    Performed by Otilio Urrutia M.D. at [facility]   • OTHER CARDIAC SURGERY  1994    in [place] for \"heart murmur\"     SOCHX:  reports that he has never smoked. He has never used smokeless tobacco. He reports current alcohol use. He reports previous drug use. Drug: Marijuana.  FH: Family history was reviewed, no pertinent findings to report    Review of Systems   Constitutional: Positive for fever (The patient reports a subjective fever.).   HENT: Positive for congestion, rhinorrhea and sore throat. Negative for ear pain.    Eyes: Positive for discharge and redness.   Respiratory: Positive for cough. Negative for shortness of breath.    Cardiovascular: Negative for chest pain.   Gastrointestinal: Negative for diarrhea, nausea and vomiting.   Musculoskeletal: Negative for myalgias.   Neurological: Positive for headaches.              Objective     /78   Pulse 94   Temp 36.6 °C (97.9 °F) (Temporal)   Resp 14   Ht 1.702 m (5' 7\")   Wt 79.4 kg (175 lb)   SpO2 98%   BMI 27.41 kg/m²      Physical " Exam  Constitutional:       General: He is not in acute distress.     Appearance: Normal appearance. He is not ill-appearing.   HENT:      Head: Normocephalic and atraumatic.      Right Ear: External ear normal.      Left Ear: External ear normal.      Nose: Nose normal.      Mouth/Throat:      Mouth: Mucous membranes are moist.      Pharynx: Oropharynx is clear. Uvula midline. Posterior oropharyngeal erythema present.      Tonsils: No tonsillar exudate.   Eyes:      General: Lids are normal. Vision grossly intact.         Right eye: No discharge.         Left eye: No discharge.      Extraocular Movements: Extraocular movements intact.      Conjunctiva/sclera:      Right eye: Right conjunctiva is injected.      Left eye: Left conjunctiva is injected.      Pupils: Pupils are equal, round, and reactive to light.   Cardiovascular:      Rate and Rhythm: Normal rate and regular rhythm.      Heart sounds: Normal heart sounds.   Pulmonary:      Effort: Pulmonary effort is normal. No respiratory distress.      Breath sounds: Normal breath sounds. No wheezing.   Musculoskeletal:         General: Normal range of motion.      Cervical back: Normal range of motion and neck supple.   Skin:     General: Skin is warm and dry.   Neurological:      Mental Status: He is alert and oriented to person, place, and time.               Progress:  Results for orders placed or performed in visit on 07/07/22   POCT Rapid Strep A   Result Value Ref Range    Rapid Strep Screen NEGATIVE     Internal Control Positive Valid     Internal Control Negative Valid    POCT SARS-COV Antigen MOODY (Symptomatic only)   Result Value Ref Range    Internal  Valid     SARS-COV ANTIGEN MOODY Negative Negative, Indeterminate, None Detected, Valid, Invalid, Pass        COVID-19 PCR - pending              Assessment & Plan         MDM:  The patient's presenting symptoms and physical exam is are consistent with a URI with associated cough and congestion.   The patient is also experiencing a sore throat.  Additionally, the patient is experiencing signs and symptoms of conjunctivitis.  On physical exam, the patient's bilateral conjunctiva were injected without discharge/drainage.  The patient's pupils were equally round and reactive to light.  The patient denies vision changes.  The remainder the patient's physical exam today in clinic was normal, with the exception of erythema to the posterior oropharynx.  No tonsillar hypertrophy or exudates were noted.  The patient's uvula was midline.  The patient is nontoxic and appears in no acute distress.  The patient's vital signs are stable and within normal limits.  He is afebrile today in clinic.  The patient's POCT rapid strep test today in clinic was negative.  Discussed likely viral etiology with the patient.  The patient's POCT rapid COVID-19 testing was also negative.  Based on the patient's presenting symptoms, will test patient for COVID-19 via PCR.  Will prescribe the patient Tessalon for symptomatic relief of his cough.  We will also prescribe the patient Polytrim ophthalmic antibiotic eyedrops to cover for possible bacterial conjunctivitis.  Informed the patient that his conjunctivitis may be viral in nature.  However, given the low risk of systemic absorption I believe treatment with antibiotic eyedrops is appropriate at this time.  Advised patient to monitor worsening signs or symptoms.  Recommend OTC medications and supportive care for symptomatic management.  Recommend the patient follow-up with his PCP as needed.  Discussed return precautions with the patient, and he verbalized understanding.      1. URI with cough and congestion  - POCT SARS-COV Antigen MOODY (Symptomatic only)  - benzonatate (TESSALON) 100 MG Cap; Take 1 Capsule by mouth 3 times a day as needed for Cough.  Dispense: 60 Capsule; Refill: 0  - SARS-CoV-2 PCR (24 hour In-House): Collect NP swab in VTM; Future    2. Sore throat  - POCT Rapid Strep  A    Differential diagnoses, supportive care, and indications for immediate follow-up discussed with patient.   Instructed to return to clinic or nearest emergency department for any change in condition, further concerns, or worsening of symptoms.    OTC Tylenol or Motrin for fever/discomfort.  OTC cough/cold medication for symptomatic relief  OTC Supportive Care for Congestion - saline nasal spray or neti pot  OTC Supportive Care for Sore Throat - warm salt water gargles, sore throat lozenges, warm lemon water, and/or tea.  Drink plenty of fluids  Follow-up with PCP  Return to clinic or go to the ED if symptoms worsen or fail to improve, or if the patient should develop worsening/increasing cough, congestion, ear pain, sore throat, shortness of breath, wheezing, chest pain, fever/chills, and/or any concerning symptoms.      3. Acute conjunctivitis of both eyes, unspecified acute conjunctivitis type  - polymixin-trimethoprim (POLYTRIM) 11748-7.1 UNIT/ML-% Solution; Administer 1 Drop into both eyes 4 times a day for 7 days.  Dispense: 10 mL; Refill: 0    Differential diagnoses, supportive care, and indications for immediate follow-up discussed with patient.   Instructed to return to clinic or nearest emergency department for any change in condition, further concerns, or worsening of symptoms.    OTC Tylenol or Motrin for fever/discomfort.  Avoid touching eyes  Hand Hygiene   Follow-up with PCP  AVS printed  Return to clinic or go to the ED if symptoms worsen or fail to improve, or if patient should develop worsening/increasing/persistent eye redness, eye drainage, eye pain, eye itchiness, vision changes, periorbital redness or swelling, headache, fever/chills, and/or any concerning symptoms.    Discussed plan with the patient, and he agrees to the above.    I personally reviewed prior external notes and test results pertinent to today's visit.  I have independently reviewed and interpreted all diagnostics ordered  during this urgent care visit.       Please note that this dictation was created using voice recognition software. I have made every reasonable attempt to correct obvious errors, but I expect that there may be errors of grammar and possibly content that I did not discover before finalizing the note.     This note was electronically signed by Martha Fink PA-C

## 2022-07-08 LAB
COVID ORDER STATUS COVID19: NORMAL
SARS-COV-2 RNA RESP QL NAA+PROBE: NOTDETECTED
SPECIMEN SOURCE: NORMAL

## 2022-07-26 ENCOUNTER — TELEPHONE (OUTPATIENT)
Dept: SCHEDULING | Facility: IMAGING CENTER | Age: 27
End: 2022-07-26

## 2022-07-27 PROBLEM — E55.9 VITAMIN D DEFICIENCY: Status: ACTIVE | Noted: 2022-07-27

## 2022-07-27 NOTE — PROGRESS NOTES
Subjective:     CC:  Diagnoses of Cough, Onychomycosis, Tinea pedis, left, Grade I hemorrhoids, Vitamin D deficiency, BMI 28.0-28.9,adult, Preventative health care, Screening for HIV (human immunodeficiency virus), Need for hepatitis C screening test, Genetic screening, and Encounter to establish care with new doctor were pertinent to this visit.    HISTORY OF THE PRESENT ILLNESS: Patient is a 26 y.o. male. This pleasant patient is here today to establish care and discuss chronic conditions. His prior PCP was EVI Ayala.    Problem   Cough    Acute condition.    Character: wet cough  Onset: since 7/7/2022  Location: throat  Duration: intermittent  Exacerbating factors: worse in the morning  Relieving factors: nothing  Associated symptoms: denies fever, runny nose, sore throat  Severity: persistent     Bmi 28.0-28.9,Adult    Chronic condition. He tries to eat healthy in general. He does not regularly exercise.     Onychomycosis    Chronic condition. He has fungal toenails to the left foot. He plays soccer regularly.     Tinea Pedis, Left    Chronic condition. He has been having itching to left foot.     Vitamin D Deficiency    Chronic condition.     Grade I Hemorrhoids    Chronic condition. He has tried hemorrhoid cream in the past without relief. He had better relief with hemorrhoid suppository.     Preventative Health Care    Patient is here to establish care today. Feels well overall.    Discussed and offered the no cost genetic screening through [employer]. Patient is interested in participating.         Current Outpatient Medications Ordered in Epic   Medication Sig Dispense Refill   • ciclopirox (LOPROX) 0.77 % cream Apply a thin layer to affected area two times daily until symptoms resolve 30 g 2   • hydrocortisone (ANUSOL-HC) 25 MG Suppos Insert 1 Suppository into the rectum every 12 hours. 24 Suppository 5     No current [facility]-ordered facility-administered medications on file.     Social  "history  Living situation: lives with family  Occupation: works as   Alcohol/tobacco/illicit drugs: never smoker, social EtOH, denies illicit drugs    Health Maintenance: reviewed  Vaccines: Tdap 06/2019, Pfizer COVID #1 received 10/2021    ROS:   Gen: no fevers/chills, no changes in weight  Eyes: no changes in vision  ENT: no sore throat  Pulm: no sob, + cough  CV: no chest pain, no palpitations  GI: no nausea/vomiting, no diarrhea  : no dysuria  MSk: no myalgias  Skin: + fungal toenails and itching to left foot  Neuro: no headaches, no numbness/tingling      Objective:     Exam: /74 (BP Location: Left arm, Patient Position: Sitting, BP Cuff Size: Adult)   Pulse 87   Temp 36.4 °C (97.6 °F) (Temporal)   Resp 16   Ht 1.702 m (5' 7\")   Wt 81.3 kg (179 lb 3.7 oz)   SpO2 98%  Body mass index is 28.07 kg/m².    General: Normal appearing. No distress.  HEENT: Normocephalic. Nasal mucosa benign, oropharynx is without erythema, edema or exudates.   Neck: Supple without JVD or bruit. Thyroid is not enlarged.  Pulmonary: Clear to ausculation. Normal effort. No rales, ronchi, or wheezing.  Cardiovascular: Regular rate and rhythm without murmur. Carotid and radial pulses are intact and equal bilaterally.  Abdomen: Soft, nontender, nondistended. Normal bowel sounds.  Neurologic: Grossly nonfocal  Skin: Warm and dry. + onychomycosis and redness to left foot.  Musculoskeletal: Normal gait. No extremity cyanosis, clubbing, or edema.  Psych: Normal mood and affect. Alert and oriented x3. Judgment and insight is normal.    Labs:   Lab Results   Component Value Date/Time    WBC 6.4 06/25/2019 08:51 AM    RBC 4.93 06/25/2019 08:51 AM    HEMOGLOBIN 15.4 06/25/2019 08:51 AM    HEMATOCRIT 45.1 06/25/2019 08:51 AM    MCV 91.5 06/25/2019 08:51 AM    MCH 31.2 06/25/2019 08:51 AM    MCHC 34.1 06/25/2019 08:51 AM    RDW 43.1 06/25/2019 08:51 AM    PLATELETCT 290 06/25/2019 08:51 AM    MPV 9.5 06/25/2019 08:51 AM      Lab " Results   Component Value Date/Time    SODIUM 137 06/25/2019 08:51 AM    POTASSIUM 4.0 06/25/2019 08:51 AM    CHLORIDE 106 06/25/2019 08:51 AM    CO2 23 06/25/2019 08:51 AM    ANION 8.0 06/25/2019 08:51 AM    GLUCOSE 98 06/25/2019 08:51 AM    BUN 14 06/25/2019 08:51 AM    CREATININE 0.91 06/25/2019 08:51 AM    CALCIUM 9.7 06/25/2019 08:51 AM    ASTSGOT 21 06/25/2019 08:51 AM    ALTSGPT 19 06/25/2019 08:51 AM    TBILIRUBIN 0.7 06/25/2019 08:51 AM    ALBUMIN 4.5 06/25/2019 08:51 AM    TOTPROTEIN 7.1 06/25/2019 08:51 AM    GLOBULIN 2.6 06/25/2019 08:51 AM    AGRATIO 1.7 06/25/2019 08:51 AM     Lab Results   Component Value Date/Time    CHOLSTRLTOT 140 06/25/2019 0851    TRIGLYCERIDE 135 06/25/2019 0851    HDL 42 06/25/2019 0851    LDL 71 06/25/2019 0851     25-Hydroxy   Vitamin D 25 (ng/mL)   Date Value   06/25/2019 18 (L)       Assessment & Plan:   26 y.o. male with the following -    1. Cough  Acute condition, persistent. Had recent URI early July. Likely residual bronchitis. Advised to trial Mucinex/DM as needed for cough. Also trial Flonase nasal or omeprazole if cough persists.    2. Onychomycosis  Chronic condition, persistent. Plan to trial ciclopirox per order. Refer to podiatry.  - ciclopirox (LOPROX) 0.77 % cream; Apply a thin layer to affected area two times daily until symptoms resolve  Dispense: 30 g; Refill: 2  - Referral to Podiatry    3. Tinea pedis, left  Chronic condition, persistent. Plan to trial ciclopirox per order. Refer to podiatry.  - ciclopirox (LOPROX) 0.77 % cream; Apply a thin layer to affected area two times daily until symptoms resolve  Dispense: 30 g; Refill: 2  - Referral to Podiatry    4. Grade I hemorrhoids  Chronic condition, persistent. Plan to trial suppository per order.  - hydrocortisone (ANUSOL-HC) 25 MG Suppos; Insert 1 Suppository into the rectum every 12 hours.  Dispense: 24 Suppository; Refill: 5    5. Vitamin D deficiency  Chronic condition. Plan to reassess.  - VITAMIN  D,25 HYDROXY; Future    6. BMI 28.0-28.9,adult  - Comp Metabolic Panel; Future  - HEMOGLOBIN A1C; Future  - Lipid Profile; Future  - TSH WITH REFLEX TO FT4; Future    7. Preventative health care  - recommended age appropriate vaccinations    8. Screening for HIV (human immunodeficiency virus)  - HIV AG/AB COMBO ASSAY SCREENING; Future    9. Need for hepatitis C screening test  - HEP C VIRUS ANTIBODY; Future    10. Genetic screening  - Referral to Genetic Research Studies    11. Encounter to establish care with new doctor        Return in about 4 weeks (around 8/25/2022) for lab results.    Please note that this dictation was created using voice recognition software. I have made every reasonable attempt to correct obvious errors, but I expect that there are errors of grammar and possibly content that I did not discover before finalizing the note.

## 2022-07-28 ENCOUNTER — HOSPITAL ENCOUNTER (OUTPATIENT)
Dept: LAB | Facility: MEDICAL CENTER | Age: 27
End: 2022-07-28
Attending: STUDENT IN AN ORGANIZED HEALTH CARE EDUCATION/TRAINING PROGRAM
Payer: COMMERCIAL

## 2022-07-28 ENCOUNTER — OFFICE VISIT (OUTPATIENT)
Dept: MEDICAL GROUP | Facility: MEDICAL CENTER | Age: 27
End: 2022-07-28
Payer: COMMERCIAL

## 2022-07-28 VITALS
RESPIRATION RATE: 16 BRPM | WEIGHT: 179.23 LBS | SYSTOLIC BLOOD PRESSURE: 112 MMHG | OXYGEN SATURATION: 98 % | HEIGHT: 67 IN | DIASTOLIC BLOOD PRESSURE: 74 MMHG | BODY MASS INDEX: 28.13 KG/M2 | HEART RATE: 87 BPM | TEMPERATURE: 97.6 F

## 2022-07-28 DIAGNOSIS — Z00.00 PREVENTATIVE HEALTH CARE: ICD-10-CM

## 2022-07-28 DIAGNOSIS — E55.9 VITAMIN D DEFICIENCY: ICD-10-CM

## 2022-07-28 DIAGNOSIS — K64.0 GRADE I HEMORRHOIDS: ICD-10-CM

## 2022-07-28 DIAGNOSIS — Z76.89 ENCOUNTER TO ESTABLISH CARE WITH NEW DOCTOR: ICD-10-CM

## 2022-07-28 DIAGNOSIS — Z13.79 GENETIC SCREENING: ICD-10-CM

## 2022-07-28 DIAGNOSIS — Z11.59 NEED FOR HEPATITIS C SCREENING TEST: ICD-10-CM

## 2022-07-28 DIAGNOSIS — R05.9 COUGH: ICD-10-CM

## 2022-07-28 DIAGNOSIS — B35.3 TINEA PEDIS, LEFT: ICD-10-CM

## 2022-07-28 DIAGNOSIS — Z11.4 SCREENING FOR HIV (HUMAN IMMUNODEFICIENCY VIRUS): ICD-10-CM

## 2022-07-28 DIAGNOSIS — B35.1 ONYCHOMYCOSIS: ICD-10-CM

## 2022-07-28 LAB
25(OH)D3 SERPL-MCNC: 26 NG/ML (ref 30–100)
ALBUMIN SERPL BCP-MCNC: 4.8 G/DL (ref 3.2–4.9)
ALBUMIN/GLOB SERPL: 1.7 G/DL
ALP SERPL-CCNC: 90 U/L (ref 30–99)
ALT SERPL-CCNC: 37 U/L (ref 2–50)
ANION GAP SERPL CALC-SCNC: 12 MMOL/L (ref 7–16)
AST SERPL-CCNC: 22 U/L (ref 12–45)
BILIRUB SERPL-MCNC: 0.4 MG/DL (ref 0.1–1.5)
BUN SERPL-MCNC: 10 MG/DL (ref 8–22)
CALCIUM SERPL-MCNC: 9.7 MG/DL (ref 8.4–10.2)
CHLORIDE SERPL-SCNC: 106 MMOL/L (ref 96–112)
CHOLEST SERPL-MCNC: 169 MG/DL (ref 100–199)
CO2 SERPL-SCNC: 24 MMOL/L (ref 20–33)
CREAT SERPL-MCNC: 0.96 MG/DL (ref 0.5–1.4)
EST. AVERAGE GLUCOSE BLD GHB EST-MCNC: 108 MG/DL
FASTING STATUS PATIENT QL REPORTED: NORMAL
GFR SERPLBLD CREATININE-BSD FMLA CKD-EPI: 110 ML/MIN/1.73 M 2
GLOBULIN SER CALC-MCNC: 2.9 G/DL (ref 1.9–3.5)
GLUCOSE SERPL-MCNC: 91 MG/DL (ref 65–99)
HBA1C MFR BLD: 5.4 % (ref 4–5.6)
HCV AB SER QL: NORMAL
HDLC SERPL-MCNC: 41 MG/DL
HIV 1+2 AB+HIV1 P24 AG SERPL QL IA: NORMAL
LDLC SERPL CALC-MCNC: 95 MG/DL
POTASSIUM SERPL-SCNC: 4.6 MMOL/L (ref 3.6–5.5)
PROT SERPL-MCNC: 7.7 G/DL (ref 6–8.2)
SODIUM SERPL-SCNC: 142 MMOL/L (ref 135–145)
TRIGL SERPL-MCNC: 163 MG/DL (ref 0–149)
TSH SERPL DL<=0.005 MIU/L-ACNC: 2.09 UIU/ML (ref 0.38–5.33)

## 2022-07-28 PROCEDURE — 87389 HIV-1 AG W/HIV-1&-2 AB AG IA: CPT

## 2022-07-28 PROCEDURE — 82306 VITAMIN D 25 HYDROXY: CPT

## 2022-07-28 PROCEDURE — 80053 COMPREHEN METABOLIC PANEL: CPT

## 2022-07-28 PROCEDURE — 80061 LIPID PANEL: CPT

## 2022-07-28 PROCEDURE — 84443 ASSAY THYROID STIM HORMONE: CPT

## 2022-07-28 PROCEDURE — 86803 HEPATITIS C AB TEST: CPT

## 2022-07-28 PROCEDURE — 99214 OFFICE O/P EST MOD 30 MIN: CPT | Performed by: STUDENT IN AN ORGANIZED HEALTH CARE EDUCATION/TRAINING PROGRAM

## 2022-07-28 PROCEDURE — 83036 HEMOGLOBIN GLYCOSYLATED A1C: CPT

## 2022-07-28 PROCEDURE — 36415 COLL VENOUS BLD VENIPUNCTURE: CPT

## 2022-07-28 RX ORDER — HYDROCORTISONE ACETATE 25 MG/1
25 SUPPOSITORY RECTAL EVERY 12 HOURS
Qty: 24 SUPPOSITORY | Refills: 5 | Status: SHIPPED | OUTPATIENT
Start: 2022-07-28

## 2022-07-28 ASSESSMENT — PATIENT HEALTH QUESTIONNAIRE - PHQ9: CLINICAL INTERPRETATION OF PHQ2 SCORE: 0

## 2022-08-02 ENCOUNTER — RESEARCH ENCOUNTER (OUTPATIENT)
Dept: MEDICAL GROUP | Facility: PHYSICIAN GROUP | Age: 27
End: 2022-08-02
Payer: COMMERCIAL

## 2022-08-02 DIAGNOSIS — Z00.6 RESEARCH STUDY PATIENT: ICD-10-CM

## 2022-08-28 NOTE — PROGRESS NOTES
Subjective:     CC: follow up    HPI:   Malachi presents today for follow up    Problem   Other Sleep Apnea    Chronic condition for about a years. He has been feeling fatigued with snoring at night and daytime sleepiness.     Hypertriglyceridemia    New condition. TG level 163 (07/2022).     Onychomycosis    Chronic condition. Since last visit, he saw podiatry and was started on terbinafine 250mg daily which he has been taking for 2 weeks now.    He has fungal toenails to the left foot. He plays soccer regularly.     Tinea Pedis, Left    Chronic condition. Since last visit, he saw podiatry and was started on Nizoral cream.    He has been having itching to left foot.     Vitamin D Deficiency    Chronic condition. Most recent vitamin D level 26 (07/2022).         Current Outpatient Medications Ordered in Epic   Medication Sig Dispense Refill    terbinafine (LAMISIL) 250 MG Tab Take 250 mg by mouth every day.      ketoconazole (NIZORAL) 2 % Cream APPLY TWICE A DAY TO FUNGUS TOES AND FEET      hydrocortisone (ANUSOL-HC) 25 MG Suppos Insert 1 Suppository into the rectum every 12 hours. 24 Suppository 5     No current [facility]-ordered facility-administered medications on file.     Social history  Living situation: lives with family  Occupation: works as   Alcohol/tobacco/illicit drugs: never smoker, social EtOH, denies illicit drugs     Health Maintenance: reviewed  Vaccines: Tdap 06/2019, Pfizer COVID #1 received 10/2021     ROS:   Gen: no fevers/chills, no changes in weight, + fatigue  Eyes: no changes in vision  ENT: no sore throat  Pulm: no sob, no cough  CV: no chest pain, no palpitations  GI: no nausea/vomiting, no diarrhea  : no dysuria  MSk: no myalgias  Skin: + fungal toenails and itching to left foot  Neuro: no headaches, no numbness/tingling    Objective:     Exam:  /68 (BP Location: Left arm, Patient Position: Sitting, BP Cuff Size: Adult)   Pulse 76   Temp 36.6 °C (97.9 °F) (Temporal)   Resp  "16   Ht 1.702 m (5' 7\")   Wt 81.6 kg (180 lb)   SpO2 97%   BMI 28.19 kg/m²  Body mass index is 28.19 kg/m².    General: Normal appearing. No distress.  Neck: circumference 15.5in. Mallampati class 4.  Pulmonary: Clear to ausculation. Normal effort. No rales, ronchi, or wheezing.  Cardiovascular: Regular rate and rhythm without murmur.  Abdomen: Soft, nontender, nondistended. Normal bowel sounds.  Neurologic: Grossly nonfocal  Skin: Warm and dry. + onychomycosis and redness to left foot.  Musculoskeletal: Normal gait. No extremity cyanosis, clubbing, or edema.  Psych: Normal mood and affect. Alert and oriented x3. Judgment and insight is normal.    Labs:   Lab Results   Component Value Date/Time    SODIUM 142 07/28/2022 09:01 AM    POTASSIUM 4.6 07/28/2022 09:01 AM    CHLORIDE 106 07/28/2022 09:01 AM    CO2 24 07/28/2022 09:01 AM    ANION 12.0 07/28/2022 09:01 AM    GLUCOSE 91 07/28/2022 09:01 AM    BUN 10 07/28/2022 09:01 AM    CREATININE 0.96 07/28/2022 09:01 AM    CALCIUM 9.7 07/28/2022 09:01 AM    ASTSGOT 22 07/28/2022 09:01 AM    ALTSGPT 37 07/28/2022 09:01 AM    TBILIRUBIN 0.4 07/28/2022 09:01 AM    ALBUMIN 4.8 07/28/2022 09:01 AM    TOTPROTEIN 7.7 07/28/2022 09:01 AM    GLOBULIN 2.9 07/28/2022 09:01 AM    AGRATIO 1.7 07/28/2022 09:01 AM     Lab Results   Component Value Date/Time    HBA1C 5.4 07/28/2022 09:01 AM      Lab Results   Component Value Date/Time    CHOLSTRLTOT 169 07/28/2022 0901    TRIGLYCERIDE 163 (H) 07/28/2022 0901    HDL 41 07/28/2022 0901    LDL 95 07/28/2022 0901     Lab Results   Component Value Date/Time    TSHULTRASEN 2.090 07/28/2022 0901     25-Hydroxy   Vitamin D 25 (ng/mL)   Date Value   07/28/2022 26 (L)   06/25/2019 18 (L)       Assessment & Plan:     26 y.o. male with the following -     1. Other sleep apnea  Chronic condition, persistent. Suspicious for sleep apnea.  - plan to evaluate with home sleep study if covered by insurance, or sleep medicine referral if needed  - " Overnight Home Sleep Study; Future    2. Hypertriglyceridemia  New condition.  - Advised healthy diet/weight loss, regular exercise    3. Vitamin D deficiency  Chronic condition, stable.  - advised to consider over the counter vitamin D3 1000 IU daily    4. Onychomycosis  Chronic condition, stable. Followed and managed by podiatry.  - cont current regimen: terbinafine 250mg daily    5. Tinea pedis, left  Chronic condition, stable. Followed and managed by podiatry.  - cont current regimen: Nizoral cream    Return in about 1 year (around 8/30/2023) for annual physical.    Please note that this dictation was created using voice recognition software. I have made every reasonable attempt to correct obvious errors, but I expect that there are errors of grammar and possibly content that I did not discover before finalizing the note.

## 2022-08-29 PROBLEM — E78.1 HYPERTRIGLYCERIDEMIA: Status: ACTIVE | Noted: 2022-08-29

## 2022-08-30 ENCOUNTER — OFFICE VISIT (OUTPATIENT)
Dept: MEDICAL GROUP | Facility: MEDICAL CENTER | Age: 27
End: 2022-08-30
Payer: COMMERCIAL

## 2022-08-30 VITALS
TEMPERATURE: 97.9 F | HEART RATE: 76 BPM | DIASTOLIC BLOOD PRESSURE: 68 MMHG | WEIGHT: 180 LBS | SYSTOLIC BLOOD PRESSURE: 116 MMHG | RESPIRATION RATE: 16 BRPM | OXYGEN SATURATION: 97 % | HEIGHT: 67 IN | BODY MASS INDEX: 28.25 KG/M2

## 2022-08-30 DIAGNOSIS — B35.1 ONYCHOMYCOSIS: ICD-10-CM

## 2022-08-30 DIAGNOSIS — E55.9 VITAMIN D DEFICIENCY: ICD-10-CM

## 2022-08-30 DIAGNOSIS — G47.39 OTHER SLEEP APNEA: ICD-10-CM

## 2022-08-30 DIAGNOSIS — B35.3 TINEA PEDIS, LEFT: ICD-10-CM

## 2022-08-30 DIAGNOSIS — E78.1 HYPERTRIGLYCERIDEMIA: ICD-10-CM

## 2022-08-30 PROCEDURE — 99214 OFFICE O/P EST MOD 30 MIN: CPT | Performed by: STUDENT IN AN ORGANIZED HEALTH CARE EDUCATION/TRAINING PROGRAM

## 2022-08-30 RX ORDER — KETOCONAZOLE 20 MG/G
CREAM TOPICAL
COMMUNITY
Start: 2022-08-19 | End: 2023-08-30

## 2022-08-30 RX ORDER — TERBINAFINE HYDROCHLORIDE 250 MG/1
250 TABLET ORAL
COMMUNITY
Start: 2022-08-19 | End: 2023-08-30

## 2022-09-07 DIAGNOSIS — G47.39 OTHER SLEEP APNEA: ICD-10-CM

## 2022-09-08 NOTE — PROGRESS NOTES
Referral to sleep medicine placed.   Patient seen and examined in bed. pleasant.  Denies SOB or pain.  NAD. ONE TO ONE OBSERVATION NOTED.  /73, on midodrine    REVIEW OF SYSTEMS:  As per HPI, otherwise 8 full 10 ROS were unremarkable    MEDICATIONS  (STANDING):  aMIOdarone    Tablet 200 milliGRAM(s) Oral daily  atorvastatin 10 milliGRAM(s) Oral at bedtime  cyanocobalamin 1000 MICROGram(s) Oral daily  danazol 200 milliGRAM(s) Oral <User Schedule>  folic acid 1 milliGRAM(s) Oral daily  levETIRAcetam 500 milliGRAM(s) Oral two times a day  meropenem  IVPB 1000 milliGRAM(s) IV Intermittent every 12 hours  metoprolol succinate ER 25 milliGRAM(s) Oral daily  midodrine. 5 milliGRAM(s) Oral every 8 hours      VITAL:  T(C): , Max: 37.2 (02-28-21 @ 11:23)  T(F): , Max: 98.9 (02-28-21 @ 11:23)  HR: 64 (02-28-21 @ 11:23)  BP: 116/73 (02-28-21 @ 11:23)  BP(mean): --  RR: 19 (02-28-21 @ 11:23)  SpO2: 99% (02-28-21 @ 11:23)  Wt(kg): --    I and O's:    02-28 @ 07:01  -  02-28 @ 12:15  --------------------------------------------------------  IN: 240 mL / OUT: 150 mL / NET: 90 mL          PHYSICAL EXAM:    Constitutional: NAD  Neck: Supple, No JVD  Respiratory: CTA-b/l  Cardiovascular: RRR s1s2, no m/r/g  Gastrointestinal: BS+, soft, NT/ND  Extremities: No peripheral edema b/l  Neurological: no focal deficits; strength grossly intact  : No Medrano    LABS:                        8.1    4.13  )-----------( 191      ( 28 Feb 2021 06:26 )             24.6     02-28    143  |  110<H>  |  25<H>  ----------------------------<  83  4.2   |  23  |  1.50<H>    Ca    8.4      28 Feb 2021 06:26

## 2022-09-11 LAB
APOB+LDLR+PCSK9 GENE MUT ANL BLD/T: NOT DETECTED
BRCA1+BRCA2 DEL+DUP + FULL MUT ANL BLD/T: NOT DETECTED
MLH1+MSH2+MSH6+PMS2 GN DEL+DUP+FUL M: NOT DETECTED

## 2022-11-28 ENCOUNTER — TELEPHONE (OUTPATIENT)
Dept: HEALTH INFORMATION MANAGEMENT | Facility: OTHER | Age: 27
End: 2022-11-28
Payer: COMMERCIAL

## 2023-08-30 ENCOUNTER — OFFICE VISIT (OUTPATIENT)
Dept: MEDICAL GROUP | Facility: MEDICAL CENTER | Age: 28
End: 2023-08-30
Payer: COMMERCIAL

## 2023-08-30 VITALS
DIASTOLIC BLOOD PRESSURE: 80 MMHG | WEIGHT: 176.4 LBS | HEIGHT: 67 IN | SYSTOLIC BLOOD PRESSURE: 120 MMHG | BODY MASS INDEX: 27.69 KG/M2 | HEART RATE: 68 BPM | OXYGEN SATURATION: 97 % | TEMPERATURE: 97.6 F

## 2023-08-30 DIAGNOSIS — Z00.00 PREVENTATIVE HEALTH CARE: ICD-10-CM

## 2023-08-30 DIAGNOSIS — E78.1 HYPERTRIGLYCERIDEMIA: ICD-10-CM

## 2023-08-30 DIAGNOSIS — E55.9 VITAMIN D DEFICIENCY: ICD-10-CM

## 2023-08-30 PROBLEM — B35.1 ONYCHOMYCOSIS: Status: RESOLVED | Noted: 2022-07-28 | Resolved: 2023-08-30

## 2023-08-30 PROBLEM — R05.9 COUGH: Status: RESOLVED | Noted: 2022-07-28 | Resolved: 2023-08-30

## 2023-08-30 PROBLEM — B35.3 TINEA PEDIS, LEFT: Status: RESOLVED | Noted: 2022-07-28 | Resolved: 2023-08-30

## 2023-08-30 PROCEDURE — 99395 PREV VISIT EST AGE 18-39: CPT | Performed by: STUDENT IN AN ORGANIZED HEALTH CARE EDUCATION/TRAINING PROGRAM

## 2023-08-30 PROCEDURE — 3074F SYST BP LT 130 MM HG: CPT | Performed by: STUDENT IN AN ORGANIZED HEALTH CARE EDUCATION/TRAINING PROGRAM

## 2023-08-30 PROCEDURE — 3079F DIAST BP 80-89 MM HG: CPT | Performed by: STUDENT IN AN ORGANIZED HEALTH CARE EDUCATION/TRAINING PROGRAM

## 2023-08-30 ASSESSMENT — PATIENT HEALTH QUESTIONNAIRE - PHQ9: CLINICAL INTERPRETATION OF PHQ2 SCORE: 0

## 2023-08-30 NOTE — PROGRESS NOTES
Subjective:     CC:   Chief Complaint   Patient presents with    Annual Exam       HPI:   Malachi Carrion III is a 27 y.o. male who presents for an annual exam. He is feeling well and has no complaints.    Problem   Hypertriglyceridemia    Chronic condition. TG level 163 (07/2022).     Bmi 27.0-27.9,Adult    Chronic condition. He tries to eat healthy in general. He does not regularly exercise.     Vitamin D Deficiency    Chronic condition. Most recent vitamin D level 26 (07/2022).     Preventative Health Care    Patient is here for annual physical today. Feels well overall.     Cough (Resolved)    Acute condition.    Character: wet cough  Onset: since 7/7/2022  Location: throat  Duration: intermittent  Exacerbating factors: worse in the morning  Relieving factors: nothing  Associated symptoms: denies fever, runny nose, sore throat  Severity: persistent     Onychomycosis (Resolved)    Chronic condition. Since last visit, he saw podiatry and was started on terbinafine 250mg daily which he has been taking for 2 weeks now.    He has fungal toenails to the left foot. He plays soccer regularly.     Tinea Pedis, Left (Resolved)    Chronic condition. Since last visit, he saw podiatry and was started on Nizoral cream.    He has been having itching to left foot.         Health Maintenance  Advanced directive: n/a  PT/vit D for falls prevention: n/a  Cholesterol Screening: n/a  Diabetes Screening: n/a  AAA Screening: n/a  Aspirin Use: n/a  Diet: eats healthy in general  Exercise: regular exercise with soccer and dance team  Substance Abuse: n/a  Safe in relationship.   Seat belts, bike helmet, gun safety discussed.  Sun protection used.    Cancer screening  Colorectal Cancer Screening: n/a  Lung Cancer Screening: n/a  Prostate Cancer Screening/PSA: n/a    Infectious disease screening/Immunizations  --STI Screening: n/a  --Practices safe sex.  --HIV Screening: done 2022  --Hepatitis C Screening: done  2022  --Immunizations:    Influenza: due   HPV:  n/a   Tetanus: done 06/2019   Shingles: n/a   Pneumococcal : n/a   COVID-19: Pfizer COVID #2 received (no plans for booster)  Other immunizations: n/a    He  has a past medical history of Hypertriglyceridemia (8/29/2022).    He has no past medical history of Depression, Diabetes, or Hypertension.  He  has a past surgical history that includes other cardiac surgery (1994) and appendectomy laparoscopic (1/9/2014).  Family History   Problem Relation Age of Onset    Other Mother         heart murmur    Cancer Neg Hx     Diabetes Neg Hx     Heart Attack Neg Hx     Heart Disease Neg Hx     Stroke Neg Hx      Social History     Tobacco Use    Smoking status: Never    Smokeless tobacco: Never   Vaping Use    Vaping Use: Never used   Substance Use Topics    Alcohol use: Yes     Comment: 2-3 per week (Beer)     Drug use: Not Currently     Types: Marijuana     Comment: eddible       Patient Active Problem List    Diagnosis Date Noted    Other sleep apnea 08/30/2022    Hypertriglyceridemia 08/29/2022    BMI 27.0-27.9,adult 07/28/2022    Vitamin D deficiency 07/27/2022    Grade I hemorrhoids 01/22/2020    Need for vaccination 06/25/2019    Preventative health care 06/25/2019    STI (sexually transmitted infection) 06/25/2019    Grief 06/25/2019       Current Outpatient Medications   Medication Sig Dispense Refill    hydrocortisone (ANUSOL-HC) 25 MG Suppos Insert 1 Suppository into the rectum every 12 hours. 24 Suppository 5     No current facility-administered medications for this visit.    (including changes today)  Allergies: Patient has no known allergies.    Review of Systems   Constitutional: Negative for fever, chills and malaise/fatigue.   HENT: Negative for congestion.    Eyes: Negative for pain.   Respiratory: Negative for cough and shortness of breath.    Cardiovascular: Negative for leg swelling.   Gastrointestinal: Negative for nausea, vomiting, abdominal pain and  "diarrhea.   Genitourinary: Negative for dysuria and hematuria.   Skin: Negative for rash.   Neurological: Negative for dizziness, focal weakness and headaches.   Endo/Heme/Allergies: Does not bleed easily.   Psychiatric/Behavioral: Negative for depression. The patient is not nervous/anxious.      Objective:     /80 (BP Location: Left arm, Patient Position: Sitting, BP Cuff Size: Adult)   Pulse 68   Temp 36.4 °C (97.6 °F) (Temporal)   Ht 1.702 m (5' 7\")   Wt 80 kg (176 lb 6.4 oz)   SpO2 97%   BMI 27.63 kg/m²   Body mass index is 27.63 kg/m².  Wt Readings from Last 4 Encounters:   08/30/23 80 kg (176 lb 6.4 oz)   08/30/22 81.6 kg (180 lb)   07/28/22 81.3 kg (179 lb 3.7 oz)   07/07/22 79.4 kg (175 lb)       Physical Exam:  Constitutional: Well-developed and well-nourished. Not diaphoretic. No distress.   Skin: Skin is warm and dry. No rash noted.  Head: Atraumatic without lesions.  Eyes: Conjunctivae and extraocular motions are normal. Pupils are equal, round, and reactive to light. No scleral icterus.   Ears:  External ears unremarkable. Tympanic membranes clear and intact.  Nose: Nares patent. Septum midline. Turbinates without erythema nor edema. No discharge.   Mouth/Throat: Dentition is good. Tongue normal. Oropharynx is clear and moist. Posterior pharynx without erythema or exudates.  Neck: Supple, trachea midline. Normal range of motion. No thyromegaly present. No lymphadenopathy--cervical or supraclavicular.  Cardiovascular: Regular rate and rhythm, S1 and S2 without murmur, rubs, or gallops.    Lungs: Effort normal. Clear to auscultation throughout. No adventitious sounds. No CVA tenderness.  Abdomen: Soft, non tender, and without distention. Active bowel sounds in all four quadrants. No rebound, guarding, masses or HSM.  : deferred  Rectal: deferred  Prostate: deferred  Extremities: No cyanosis, clubbing, erythema, nor edema. Distal pulses intact and symmetric.   Musculoskeletal: All major " joints AROM full in all directions without pain.  Neurological: Alert and oriented x 3. DTRs 2+/3 and symmetric. No cranial nerve deficit. 5/5 myotomes. Sensation intact.  Psychiatric:  Behavior, mood, and affect are appropriate.    Assessment and Plan:     1. Preventative health care  - Recommended age appropriate vaccinations and cancer screening  - Labs offered, patient politely declined.  - Vaccinations reviewed and discussed.  - Counseling about diet, supplements, exercise, skin care.  - Follow up 1 year for annual physical        2. Hypertriglyceridemia  Chronic condition. Stable with healthy lifestyle management.      3. Vitamin D deficiency  Chronic condition, stable.  - cont home multivitamins          Follow-up: Return in about 1 year (around 8/30/2024) for annual physical.

## 2023-09-22 ENCOUNTER — DOCUMENTATION (OUTPATIENT)
Dept: HEALTH INFORMATION MANAGEMENT | Facility: OTHER | Age: 28
End: 2023-09-22
Payer: COMMERCIAL

## 2023-09-22 ENCOUNTER — TELEPHONE (OUTPATIENT)
Dept: HEALTH INFORMATION MANAGEMENT | Facility: OTHER | Age: 28
End: 2023-09-22
Payer: COMMERCIAL

## 2024-06-28 ENCOUNTER — OFFICE VISIT (OUTPATIENT)
Dept: URGENT CARE | Facility: CLINIC | Age: 29
End: 2024-06-28
Payer: COMMERCIAL

## 2024-06-28 VITALS
HEART RATE: 98 BPM | WEIGHT: 180 LBS | RESPIRATION RATE: 18 BRPM | DIASTOLIC BLOOD PRESSURE: 82 MMHG | BODY MASS INDEX: 28.25 KG/M2 | SYSTOLIC BLOOD PRESSURE: 132 MMHG | HEIGHT: 67 IN | TEMPERATURE: 98.5 F | OXYGEN SATURATION: 95 %

## 2024-06-28 DIAGNOSIS — B35.6 JOCK ITCH: ICD-10-CM

## 2024-06-28 RX ORDER — FLUCONAZOLE 150 MG/1
150 TABLET ORAL
Qty: 3 TABLET | Refills: 0 | Status: SHIPPED | OUTPATIENT
Start: 2024-06-28

## 2024-06-28 NOTE — PROGRESS NOTES
"Subjective     Malachi Carrion III is a 28 y.o. male who presents with Rash (Lt groin side, feels it \"like cutting\" hard time siting x 1-1.5 week )    - This is a very pleasant 28 y.o. who has come to the walk-in clinic today for itchy rash left groin for about a week.  Getting a little raw and sore.  Nothing new he can think of to have caused it.  Feels well otherwise and is without nausea vomiting fevers chills          ALLERGIES:  Patient has no known allergies.     PMH:  Past Medical History:   Diagnosis Date    Hypertriglyceridemia 8/29/2022        PSH:  Past Surgical History:   Procedure Laterality Date    APPENDECTOMY LAPAROSCOPIC  1/9/2014    Performed by Otilio Urrutia M.D. at [facility]    OTHER CARDIAC SURGERY  1994    in [place] for \"heart murmur\"       MEDS:    Current Outpatient Medications:     econazole nitrate 1 % cream, AAA QHS x 2 weeks, Disp: 30 g, Rfl: 0    fluconazole (DIFLUCAN) 150 MG tablet, Take 1 Tablet by mouth every 48 hours., Disp: 3 Tablet, Rfl: 0    ** I have documented what I find to be significant in regards to past medical, social, family and surgical history  in my HPI or under PMH/PSH/FH review section, otherwise it is noncontributory **           HPI    Review of Systems   Skin:  Positive for rash.   All other systems reviewed and are negative.             Objective     /82 (BP Location: Left arm, Patient Position: Sitting, BP Cuff Size: Adult)   Pulse 98   Temp 36.9 °C (98.5 °F) (Temporal)   Resp 18   Ht 1.702 m (5' 7\")   Wt 81.6 kg (180 lb)   SpO2 95%   BMI 28.19 kg/m²      Physical Exam  Vitals and nursing note reviewed.   Constitutional:       General: He is not in acute distress.     Appearance: Normal appearance. He is well-developed.   HENT:      Head: Normocephalic.   Cardiovascular:      Rate and Rhythm: Normal rate and regular rhythm.   Pulmonary:      Effort: Pulmonary effort is normal. No respiratory distress. "   Genitourinary:         Comments: Left inguinal area there is some erythema and a little bit of flaking skin  Neurological:      Mental Status: He is alert.      Motor: No abnormal muscle tone.   Psychiatric:         Mood and Affect: Mood normal.         Behavior: Behavior normal.                             Assessment & Plan     1. Jock itch  econazole nitrate 1 % cream    fluconazole (DIFLUCAN) 150 MG tablet          - Dx, plan & d/c instructions discussed   - Rest, stay cool and dry    Follow up with your regular primary care providers office within a week to keep them updated and informed of this visit and for regular routine health maintenance check-ups. ER if not improving in 2-3 days or if feeling/getting worse. (If you do not have a primary care provider and need to schedule one you may call [facility] at [phone] to do this).    Patient left in stable condition       Discussed if any testing, labs or imaging studies are obtained outside of the [facility] facility, it is their responsibility to contact the Urgent Care and let us know that it was done and get us the results so adequate follow up can be initiated    Pertinent prior lab work and/or imaging studies in Epic have been reviewed by me today on day of this visit and taken into account for my treatment and plan today    Pertinent PMH/PSH and/or chronic conditions and medications if any were reviewed today and taken into account for my treatment and plan today    Pertinent prior office visit notes in [facility] have been reviewed by me today on day of this visit.    Please note that this dictation may have been created using voice recognition software, if so I have made every reasonable attempt to correct obvious errors, but I expect that there are errors of grammar and possibly content that I did not discover before finalizing the note.

## 2024-08-27 ENCOUNTER — HOSPITAL ENCOUNTER (OUTPATIENT)
Dept: LAB | Facility: MEDICAL CENTER | Age: 29
End: 2024-08-27
Attending: NURSE PRACTITIONER
Payer: COMMERCIAL

## 2024-08-27 ENCOUNTER — OFFICE VISIT (OUTPATIENT)
Dept: MEDICAL GROUP | Facility: MEDICAL CENTER | Age: 29
End: 2024-08-27
Payer: COMMERCIAL

## 2024-08-27 VITALS
BODY MASS INDEX: 30.35 KG/M2 | HEIGHT: 67 IN | TEMPERATURE: 98.6 F | DIASTOLIC BLOOD PRESSURE: 74 MMHG | HEART RATE: 89 BPM | SYSTOLIC BLOOD PRESSURE: 122 MMHG | WEIGHT: 193.34 LBS | OXYGEN SATURATION: 97 %

## 2024-08-27 DIAGNOSIS — R86.9 SEMEN ABNORMAL: ICD-10-CM

## 2024-08-27 DIAGNOSIS — R06.83 SNORING: ICD-10-CM

## 2024-08-27 DIAGNOSIS — Z00.00 PE (PHYSICAL EXAM), ANNUAL: ICD-10-CM

## 2024-08-27 DIAGNOSIS — Z11.3 ROUTINE SCREENING FOR STI (SEXUALLY TRANSMITTED INFECTION): ICD-10-CM

## 2024-08-27 DIAGNOSIS — R53.83 OTHER FATIGUE: ICD-10-CM

## 2024-08-27 LAB
25(OH)D3 SERPL-MCNC: 12 NG/ML (ref 30–100)
ALBUMIN SERPL BCP-MCNC: 4.5 G/DL (ref 3.2–4.9)
ALBUMIN/GLOB SERPL: 1.5 G/DL
ALP SERPL-CCNC: 97 U/L (ref 30–99)
ALT SERPL-CCNC: 99 U/L (ref 2–50)
ANION GAP SERPL CALC-SCNC: 13 MMOL/L (ref 7–16)
AST SERPL-CCNC: 35 U/L (ref 12–45)
BILIRUB SERPL-MCNC: 0.4 MG/DL (ref 0.1–1.5)
BUN SERPL-MCNC: 13 MG/DL (ref 8–22)
CALCIUM ALBUM COR SERPL-MCNC: 9 MG/DL (ref 8.5–10.5)
CALCIUM SERPL-MCNC: 9.4 MG/DL (ref 8.4–10.2)
CHLORIDE SERPL-SCNC: 104 MMOL/L (ref 96–112)
CHOLEST SERPL-MCNC: 209 MG/DL (ref 100–199)
CO2 SERPL-SCNC: 22 MMOL/L (ref 20–33)
CREAT SERPL-MCNC: 1.05 MG/DL (ref 0.5–1.4)
ERYTHROCYTE [DISTWIDTH] IN BLOOD BY AUTOMATED COUNT: 41.1 FL (ref 35.9–50)
EST. AVERAGE GLUCOSE BLD GHB EST-MCNC: 111 MG/DL
FASTING STATUS PATIENT QL REPORTED: NORMAL
GFR SERPLBLD CREATININE-BSD FMLA CKD-EPI: 97 ML/MIN/1.73 M 2
GLOBULIN SER CALC-MCNC: 3 G/DL (ref 1.9–3.5)
GLUCOSE SERPL-MCNC: 109 MG/DL (ref 65–99)
HBA1C MFR BLD: 5.5 % (ref 4–5.6)
HCT VFR BLD AUTO: 45.8 % (ref 42–52)
HDLC SERPL-MCNC: 38 MG/DL
HGB BLD-MCNC: 16.2 G/DL (ref 14–18)
HIV 1+2 AB+HIV1 P24 AG SERPL QL IA: NORMAL
LDLC SERPL CALC-MCNC: 105 MG/DL
MCH RBC QN AUTO: 30.9 PG (ref 27–33)
MCHC RBC AUTO-ENTMCNC: 35.4 G/DL (ref 32.3–36.5)
MCV RBC AUTO: 87.4 FL (ref 81.4–97.8)
PLATELET # BLD AUTO: 350 K/UL (ref 164–446)
PMV BLD AUTO: 9.1 FL (ref 9–12.9)
POTASSIUM SERPL-SCNC: 4 MMOL/L (ref 3.6–5.5)
PROT SERPL-MCNC: 7.5 G/DL (ref 6–8.2)
RBC # BLD AUTO: 5.24 M/UL (ref 4.7–6.1)
SODIUM SERPL-SCNC: 139 MMOL/L (ref 135–145)
T PALLIDUM AB SER QL IA: NORMAL
TRIGL SERPL-MCNC: 331 MG/DL (ref 0–149)
TSH SERPL DL<=0.005 MIU/L-ACNC: 2.59 UIU/ML (ref 0.38–5.33)
WBC # BLD AUTO: 6.1 K/UL (ref 4.8–10.8)

## 2024-08-27 PROCEDURE — 83036 HEMOGLOBIN GLYCOSYLATED A1C: CPT

## 2024-08-27 PROCEDURE — 84443 ASSAY THYROID STIM HORMONE: CPT

## 2024-08-27 PROCEDURE — 87491 CHLMYD TRACH DNA AMP PROBE: CPT

## 2024-08-27 PROCEDURE — 87591 N.GONORRHOEAE DNA AMP PROB: CPT

## 2024-08-27 PROCEDURE — 80053 COMPREHEN METABOLIC PANEL: CPT

## 2024-08-27 PROCEDURE — 80061 LIPID PANEL: CPT

## 2024-08-27 PROCEDURE — 82306 VITAMIN D 25 HYDROXY: CPT

## 2024-08-27 PROCEDURE — 87389 HIV-1 AG W/HIV-1&-2 AB AG IA: CPT

## 2024-08-27 PROCEDURE — 36415 COLL VENOUS BLD VENIPUNCTURE: CPT

## 2024-08-27 PROCEDURE — 85027 COMPLETE CBC AUTOMATED: CPT

## 2024-08-27 PROCEDURE — 86780 TREPONEMA PALLIDUM: CPT

## 2024-08-27 SDOH — HEALTH STABILITY: PHYSICAL HEALTH: ON AVERAGE, HOW MANY MINUTES DO YOU ENGAGE IN EXERCISE AT THIS LEVEL?: 60 MIN

## 2024-08-27 SDOH — ECONOMIC STABILITY: TRANSPORTATION INSECURITY
IN THE PAST 12 MONTHS, HAS LACK OF TRANSPORTATION KEPT YOU FROM MEETINGS, WORK, OR FROM GETTING THINGS NEEDED FOR DAILY LIVING?: NO

## 2024-08-27 SDOH — ECONOMIC STABILITY: HOUSING INSECURITY
IN THE LAST 12 MONTHS, WAS THERE A TIME WHEN YOU DID NOT HAVE A STEADY PLACE TO SLEEP OR SLEPT IN A SHELTER (INCLUDING NOW)?: NO

## 2024-08-27 SDOH — ECONOMIC STABILITY: TRANSPORTATION INSECURITY
IN THE PAST 12 MONTHS, HAS THE LACK OF TRANSPORTATION KEPT YOU FROM MEDICAL APPOINTMENTS OR FROM GETTING MEDICATIONS?: NO

## 2024-08-27 SDOH — ECONOMIC STABILITY: FOOD INSECURITY: WITHIN THE PAST 12 MONTHS, YOU WORRIED THAT YOUR FOOD WOULD RUN OUT BEFORE YOU GOT MONEY TO BUY MORE.: NEVER TRUE

## 2024-08-27 SDOH — ECONOMIC STABILITY: FOOD INSECURITY: WITHIN THE PAST 12 MONTHS, THE FOOD YOU BOUGHT JUST DIDN'T LAST AND YOU DIDN'T HAVE MONEY TO GET MORE.: NEVER TRUE

## 2024-08-27 SDOH — ECONOMIC STABILITY: INCOME INSECURITY: HOW HARD IS IT FOR YOU TO PAY FOR THE VERY BASICS LIKE FOOD, HOUSING, MEDICAL CARE, AND HEATING?: NOT VERY HARD

## 2024-08-27 SDOH — ECONOMIC STABILITY: TRANSPORTATION INSECURITY
IN THE PAST 12 MONTHS, HAS LACK OF RELIABLE TRANSPORTATION KEPT YOU FROM MEDICAL APPOINTMENTS, MEETINGS, WORK OR FROM GETTING THINGS NEEDED FOR DAILY LIVING?: NO

## 2024-08-27 SDOH — HEALTH STABILITY: MENTAL HEALTH
STRESS IS WHEN SOMEONE FEELS TENSE, NERVOUS, ANXIOUS, OR CAN'T SLEEP AT NIGHT BECAUSE THEIR MIND IS TROUBLED. HOW STRESSED ARE YOU?: ONLY A LITTLE

## 2024-08-27 SDOH — ECONOMIC STABILITY: INCOME INSECURITY: IN THE LAST 12 MONTHS, WAS THERE A TIME WHEN YOU WERE NOT ABLE TO PAY THE MORTGAGE OR RENT ON TIME?: NO

## 2024-08-27 SDOH — HEALTH STABILITY: PHYSICAL HEALTH: ON AVERAGE, HOW MANY DAYS PER WEEK DO YOU ENGAGE IN MODERATE TO STRENUOUS EXERCISE (LIKE A BRISK WALK)?: 4 DAYS

## 2024-08-27 ASSESSMENT — LIFESTYLE VARIABLES
SKIP TO QUESTIONS 9-10: 0
AUDIT-C TOTAL SCORE: 5
HOW MANY STANDARD DRINKS CONTAINING ALCOHOL DO YOU HAVE ON A TYPICAL DAY: 3 OR 4
HOW OFTEN DO YOU HAVE A DRINK CONTAINING ALCOHOL: 2-3 TIMES A WEEK
HOW OFTEN DO YOU HAVE SIX OR MORE DRINKS ON ONE OCCASION: LESS THAN MONTHLY

## 2024-08-27 ASSESSMENT — ENCOUNTER SYMPTOMS
INSOMNIA: 0
SINUS PAIN: 0
LOSS OF CONSCIOUSNESS: 0
CHILLS: 0
HEADACHES: 0
WEIGHT LOSS: 0
BACK PAIN: 0
FLANK PAIN: 0
DIARRHEA: 0
VOMITING: 0
DEPRESSION: 0
SENSORY CHANGE: 0
BLOOD IN STOOL: 0
WEAKNESS: 0
FEVER: 0
TREMORS: 0
MYALGIAS: 0
ABDOMINAL PAIN: 0
WHEEZING: 0
EYE DISCHARGE: 0
EYE PAIN: 0
POLYDIPSIA: 0
SHORTNESS OF BREATH: 0
NERVOUS/ANXIOUS: 0
SPUTUM PRODUCTION: 0
SORE THROAT: 0
SPEECH CHANGE: 0
NAUSEA: 0
BRUISES/BLEEDS EASILY: 0
PALPITATIONS: 0
DIZZINESS: 0
EYE REDNESS: 0
CONSTIPATION: 0
COUGH: 0
FOCAL WEAKNESS: 0

## 2024-08-27 ASSESSMENT — SOCIAL DETERMINANTS OF HEALTH (SDOH)
DO YOU BELONG TO ANY CLUBS OR ORGANIZATIONS SUCH AS CHURCH GROUPS UNIONS, FRATERNAL OR ATHLETIC GROUPS, OR SCHOOL GROUPS?: YES
ARE YOU MARRIED, WIDOWED, DIVORCED, SEPARATED, NEVER MARRIED, OR LIVING WITH A PARTNER?: NEVER MARRIED
WITHIN THE PAST 12 MONTHS, YOU WORRIED THAT YOUR FOOD WOULD RUN OUT BEFORE YOU GOT THE MONEY TO BUY MORE: NEVER TRUE
HOW OFTEN DO YOU GET TOGETHER WITH FRIENDS OR RELATIVES?: MORE THAN THREE TIMES A WEEK
HOW OFTEN DO YOU GET TOGETHER WITH FRIENDS OR RELATIVES?: MORE THAN THREE TIMES A WEEK
HOW OFTEN DO YOU HAVE A DRINK CONTAINING ALCOHOL: 2-3 TIMES A WEEK
IN A TYPICAL WEEK, HOW MANY TIMES DO YOU TALK ON THE PHONE WITH FAMILY, FRIENDS, OR NEIGHBORS?: MORE THAN THREE TIMES A WEEK
HOW OFTEN DO YOU ATTENT MEETINGS OF THE CLUB OR ORGANIZATION YOU BELONG TO?: MORE THAN 4 TIMES PER YEAR
IN A TYPICAL WEEK, HOW MANY TIMES DO YOU TALK ON THE PHONE WITH FAMILY, FRIENDS, OR NEIGHBORS?: MORE THAN THREE TIMES A WEEK
HOW OFTEN DO YOU HAVE SIX OR MORE DRINKS ON ONE OCCASION: LESS THAN MONTHLY
DO YOU BELONG TO ANY CLUBS OR ORGANIZATIONS SUCH AS CHURCH GROUPS UNIONS, FRATERNAL OR ATHLETIC GROUPS, OR SCHOOL GROUPS?: YES
ARE YOU MARRIED, WIDOWED, DIVORCED, SEPARATED, NEVER MARRIED, OR LIVING WITH A PARTNER?: NEVER MARRIED
HOW MANY DRINKS CONTAINING ALCOHOL DO YOU HAVE ON A TYPICAL DAY WHEN YOU ARE DRINKING: 3 OR 4
HOW OFTEN DO YOU ATTENT MEETINGS OF THE CLUB OR ORGANIZATION YOU BELONG TO?: MORE THAN 4 TIMES PER YEAR
HOW HARD IS IT FOR YOU TO PAY FOR THE VERY BASICS LIKE FOOD, HOUSING, MEDICAL CARE, AND HEATING?: NOT VERY HARD
HOW OFTEN DO YOU ATTEND CHURCH OR RELIGIOUS SERVICES?: 1 TO 4 TIMES PER YEAR
HOW OFTEN DO YOU ATTEND CHURCH OR RELIGIOUS SERVICES?: 1 TO 4 TIMES PER YEAR
IN THE PAST 12 MONTHS, HAS THE ELECTRIC, GAS, OIL, OR WATER COMPANY THREATENED TO SHUT OFF SERVICE IN YOUR HOME?: NO

## 2024-08-27 ASSESSMENT — PATIENT HEALTH QUESTIONNAIRE - PHQ9: CLINICAL INTERPRETATION OF PHQ2 SCORE: 0

## 2024-08-27 NOTE — PROGRESS NOTES
Patient agreed to using [facility]: yes    Malachi was seen today for requesting labs and establish care.    Diagnoses and all orders for this visit:    Semen abnormal  -     Referral to Urology    Routine screening for STI (sexually transmitted infection)  -     Chlamydia/GC, PCR (Urine); Future  -     T.PALLIDUM AB FELIPE (SCREENING); Future  -     HIV AG/AB COMBO ASSAY SCREENING; Future    PE (physical exam), annual  -     CBC WITHOUT DIFFERENTIAL; Future  -     Comp Metabolic Panel; Future  -     HEMOGLOBIN A1C; Future  -     Lipid Profile; Future  -     VITAMIN D,25 HYDROXY (DEFICIENCY); Future  -     TSH WITH REFLEX TO FT4; Future    Snoring  -     Referral to Pulmonary and Sleep Medicine    Other fatigue  -     Referral to Pulmonary and Sleep Medicine              Assessment & Plan  1. Abnormal semen.  He reports a change in semen odor and consistency over the past 2 weeks. There are no changes in diet or new supplements. No other symptoms such as fever, weight loss, gastrointestinal issues, or urinary changes are present. STI checks have been ordered. If the lab results are normal and the concern persists, a referral to urology will be made for further evaluation.    2. Snoring.  He reports chronic snoring and occasional acid reflux. There is no history of holding breath or waking up gasping for air. A referral for a sleep apnea screening has been made. If sleep apnea is confirmed, appropriate treatment will be initiated.    3. Fatigue.  He reports feeling tired during the day. This may be related to potential sleep apnea. The referral for sleep apnea screening will help determine if this is the cause. If sleep apnea is ruled out, further evaluation for other causes of fatigue will be considered.    4. Annual physical exam      History of Present Illness  The patient presents for evaluation of multiple medical concerns.    He has noticed an unusual odor in his semen for the past 2 weeks, describing it as similar to  iron. There have been no changes to his diet or new supplements recently. He reports no presence of blood in his semen. His urine appears normal in color and smell, although occasionally foamy. He is not experiencing any fevers, weight loss, gastrointestinal issues, constipation, diarrhea, or blood in stool. He is in a monogamous relationship and does not report any urinary hesitancy.    He has been informed that he snores during sleep but does not experience episodes of breath-holding or waking up gasping for air. He occasionally experiences acid reflux and often feels fatigued during the day.    He is not on any regular medications. He was previously seeing Dr. Lindsay. He reports no anxiety or depression. There have been no changes in his health or family health since his last visit with Dr. Lindsay. He does not report any hearing deficits, and his vision is good.    SOCIAL HISTORY  He denies smoking or drug use.     Health Maintenance  Below Anticipatory guidance discussed with patient  Cholesterol Screening: labs  Diabetes Screening: labs  Aspirin Use: no    Diet: regular   Exercise: acti   Substance Abuse: no   Safe in relationship.   Seat belts, bike helmet, gun safety discussed.  Sun protection used.     Infectious disease screening/Immunizations  --STI Screening: ordered    --Practices safe sex.  --HIV Screening: labs   --Hepatitis C Screening: labs   --Immunizations: up to date     Review of Systems   Constitutional:  Negative for chills, fever, malaise/fatigue and weight loss.   HENT:  Negative for congestion, ear discharge, hearing loss, sinus pain and sore throat.    Eyes:  Negative for pain, discharge and redness.   Respiratory:  Negative for cough, sputum production, shortness of breath and wheezing.    Cardiovascular:  Negative for chest pain, palpitations and leg swelling.   Gastrointestinal:  Negative for abdominal pain, blood in stool, constipation, diarrhea, nausea and vomiting.   Genitourinary:   Negative for dysuria, flank pain, frequency, hematuria and urgency.   Musculoskeletal:  Negative for back pain, joint pain and myalgias.   Skin:  Negative for itching and rash.   Neurological:  Negative for dizziness, tremors, sensory change, speech change, focal weakness, loss of consciousness, weakness and headaches.   Endo/Heme/Allergies:  Negative for environmental allergies and polydipsia. Does not bruise/bleed easily.   Psychiatric/Behavioral:  Negative for depression. The patient is not nervous/anxious and does not have insomnia.         He  has a past medical history of Hypertriglyceridemia (8/29/2022).    He has no past medical history of Depression, Diabetes, or Hypertension.  He  has a past surgical history that includes other cardiac surgery (1994) and appendectomy laparoscopic (1/9/2014).  Family History   Problem Relation Age of Onset    Other Mother         heart murmur    Cancer Neg Hx     Diabetes Neg Hx     Heart Attack Neg Hx     Heart Disease Neg Hx     Stroke Neg Hx      Social History     Tobacco Use    Smoking status: Never    Smokeless tobacco: Never   Vaping Use    Vaping status: Never Used   Substance Use Topics    Alcohol use: Yes     Alcohol/week: 3.0 oz     Types: 3 Cans of beer, 2 Standard drinks or equivalent per week     Comment: casual    Drug use: Not Currently     Types: Marijuana     Comment: eddible     Patient Active Problem List    Diagnosis Date Noted    Semen abnormal 08/27/2024    Snoring 08/27/2024    Other fatigue 08/27/2024    Other sleep apnea 08/30/2022    Hypertriglyceridemia 08/29/2022    BMI 27.0-27.9,adult 07/28/2022    Vitamin D deficiency 07/27/2022    Grade I hemorrhoids 01/22/2020    Need for vaccination 06/25/2019    STI (sexually transmitted infection) 06/25/2019    Grief 06/25/2019     No current outpatient medications on file.     No current facility-administered medications for this visit.    (including changes today)  Allergies: Patient has no known  "allergies.    /74   Pulse 89   Temp 37 °C (98.6 °F)   Ht 1.702 m (5' 7\")   Wt 87.7 kg (193 lb 5.5 oz)   SpO2 97%      Physical Exam  Constitutional:       General: He is not in acute distress.     Appearance: Normal appearance. He is normal weight. He is not ill-appearing.   HENT:      Head: Normocephalic and atraumatic.      Right Ear: Tympanic membrane, ear canal and external ear normal. There is no impacted cerumen.      Left Ear: Tympanic membrane, ear canal and external ear normal. There is no impacted cerumen.      Nose: Nose normal. No congestion or rhinorrhea.      Mouth/Throat:      Mouth: Mucous membranes are moist.      Pharynx: No oropharyngeal exudate or posterior oropharyngeal erythema.   Eyes:      General:         Right eye: No discharge.         Left eye: No discharge.      Pupils: Pupils are equal, round, and reactive to light.   Cardiovascular:      Rate and Rhythm: Normal rate.      Pulses: Normal pulses.      Heart sounds: Normal heart sounds. No murmur heard.     No friction rub. No gallop.   Pulmonary:      Effort: Pulmonary effort is normal. No respiratory distress.      Breath sounds: Normal breath sounds. No wheezing, rhonchi or rales.   Abdominal:      General: Bowel sounds are normal. There is no distension.      Palpations: Abdomen is soft. There is no mass.      Tenderness: There is no abdominal tenderness. There is no right CVA tenderness, left CVA tenderness, guarding or rebound.      Hernia: No hernia is present.   Musculoskeletal:         General: No swelling, tenderness or deformity. Normal range of motion.      Cervical back: Normal range of motion and neck supple.      Right lower leg: No edema.      Left lower leg: No edema.   Lymphadenopathy:      Cervical: No cervical adenopathy.   Skin:     General: Skin is warm.      Findings: No rash.   Neurological:      General: No focal deficit present.      Mental Status: He is alert. Mental status is at baseline.      " Cranial Nerves: No cranial nerve deficit.      Sensory: No sensory deficit.      Motor: No weakness.      Coordination: Coordination normal.      Gait: Gait normal.   Psychiatric:         Mood and Affect: Mood normal.         Behavior: Behavior normal.          Results         No follow-ups on file. Annual, PRN

## 2024-08-28 LAB
C TRACH DNA SPEC QL NAA+PROBE: NEGATIVE
N GONORRHOEA DNA SPEC QL NAA+PROBE: NEGATIVE
SPECIMEN SOURCE: NORMAL

## 2024-12-11 ENCOUNTER — TELEPHONE (OUTPATIENT)
Dept: HEALTH INFORMATION MANAGEMENT | Facility: OTHER | Age: 29
End: 2024-12-11
Payer: COMMERCIAL

## 2025-08-26 SDOH — ECONOMIC STABILITY: FOOD INSECURITY: WITHIN THE PAST 12 MONTHS, YOU WORRIED THAT YOUR FOOD WOULD RUN OUT BEFORE YOU GOT MONEY TO BUY MORE.: NEVER TRUE

## 2025-08-26 SDOH — HEALTH STABILITY: PHYSICAL HEALTH: ON AVERAGE, HOW MANY MINUTES DO YOU ENGAGE IN EXERCISE AT THIS LEVEL?: 60 MIN

## 2025-08-26 SDOH — ECONOMIC STABILITY: FOOD INSECURITY: WITHIN THE PAST 12 MONTHS, THE FOOD YOU BOUGHT JUST DIDN'T LAST AND YOU DIDN'T HAVE MONEY TO GET MORE.: NEVER TRUE

## 2025-08-26 SDOH — ECONOMIC STABILITY: INCOME INSECURITY: IN THE LAST 12 MONTHS, WAS THERE A TIME WHEN YOU WERE NOT ABLE TO PAY THE MORTGAGE OR RENT ON TIME?: NO

## 2025-08-26 SDOH — HEALTH STABILITY: PHYSICAL HEALTH: ON AVERAGE, HOW MANY DAYS PER WEEK DO YOU ENGAGE IN MODERATE TO STRENUOUS EXERCISE (LIKE A BRISK WALK)?: 4 DAYS

## 2025-08-26 SDOH — ECONOMIC STABILITY: INCOME INSECURITY: HOW HARD IS IT FOR YOU TO PAY FOR THE VERY BASICS LIKE FOOD, HOUSING, MEDICAL CARE, AND HEATING?: NOT VERY HARD

## 2025-08-26 ASSESSMENT — LIFESTYLE VARIABLES
HOW MANY STANDARD DRINKS CONTAINING ALCOHOL DO YOU HAVE ON A TYPICAL DAY: 3 OR 4
AUDIT-C TOTAL SCORE: 5
HOW OFTEN DO YOU HAVE A DRINK CONTAINING ALCOHOL: 2-3 TIMES A WEEK
SKIP TO QUESTIONS 9-10: 0
HOW OFTEN DO YOU HAVE SIX OR MORE DRINKS ON ONE OCCASION: LESS THAN MONTHLY

## 2025-08-26 ASSESSMENT — SOCIAL DETERMINANTS OF HEALTH (SDOH)
HOW OFTEN DO YOU ATTENT MEETINGS OF THE CLUB OR ORGANIZATION YOU BELONG TO?: MORE THAN 4 TIMES PER YEAR
DO YOU BELONG TO ANY CLUBS OR ORGANIZATIONS SUCH AS CHURCH GROUPS UNIONS, FRATERNAL OR ATHLETIC GROUPS, OR SCHOOL GROUPS?: YES
HOW OFTEN DO YOU ATTEND CHURCH OR RELIGIOUS SERVICES?: 1 TO 4 TIMES PER YEAR
IN THE PAST 12 MONTHS, HAS THE ELECTRIC, GAS, OIL, OR WATER COMPANY THREATENED TO SHUT OFF SERVICE IN YOUR HOME?: NO
HOW OFTEN DO YOU HAVE SIX OR MORE DRINKS ON ONE OCCASION: LESS THAN MONTHLY
IN A TYPICAL WEEK, HOW MANY TIMES DO YOU TALK ON THE PHONE WITH FAMILY, FRIENDS, OR NEIGHBORS?: MORE THAN THREE TIMES A WEEK
IN A TYPICAL WEEK, HOW MANY TIMES DO YOU TALK ON THE PHONE WITH FAMILY, FRIENDS, OR NEIGHBORS?: MORE THAN THREE TIMES A WEEK
HOW OFTEN DO YOU GET TOGETHER WITH FRIENDS OR RELATIVES?: MORE THAN THREE TIMES A WEEK
HOW OFTEN DO YOU GET TOGETHER WITH FRIENDS OR RELATIVES?: MORE THAN THREE TIMES A WEEK
HOW HARD IS IT FOR YOU TO PAY FOR THE VERY BASICS LIKE FOOD, HOUSING, MEDICAL CARE, AND HEATING?: NOT VERY HARD
HOW OFTEN DO YOU HAVE A DRINK CONTAINING ALCOHOL: 2-3 TIMES A WEEK
HOW MANY DRINKS CONTAINING ALCOHOL DO YOU HAVE ON A TYPICAL DAY WHEN YOU ARE DRINKING: 3 OR 4
ARE YOU MARRIED, WIDOWED, DIVORCED, SEPARATED, NEVER MARRIED, OR LIVING WITH A PARTNER?: NEVER MARRIED
HOW OFTEN DO YOU ATTENT MEETINGS OF THE CLUB OR ORGANIZATION YOU BELONG TO?: MORE THAN 4 TIMES PER YEAR
HOW OFTEN DO YOU ATTEND CHURCH OR RELIGIOUS SERVICES?: 1 TO 4 TIMES PER YEAR
WITHIN THE PAST 12 MONTHS, YOU WORRIED THAT YOUR FOOD WOULD RUN OUT BEFORE YOU GOT THE MONEY TO BUY MORE: NEVER TRUE
DO YOU BELONG TO ANY CLUBS OR ORGANIZATIONS SUCH AS CHURCH GROUPS UNIONS, FRATERNAL OR ATHLETIC GROUPS, OR SCHOOL GROUPS?: YES
ARE YOU MARRIED, WIDOWED, DIVORCED, SEPARATED, NEVER MARRIED, OR LIVING WITH A PARTNER?: NEVER MARRIED

## 2025-08-27 ENCOUNTER — HOSPITAL ENCOUNTER (OUTPATIENT)
Facility: MEDICAL CENTER | Age: 30
End: 2025-08-27
Attending: NURSE PRACTITIONER
Payer: COMMERCIAL

## 2025-08-27 ENCOUNTER — OFFICE VISIT (OUTPATIENT)
Dept: MEDICAL GROUP | Facility: MEDICAL CENTER | Age: 30
End: 2025-08-27
Payer: COMMERCIAL

## 2025-08-27 ENCOUNTER — HOSPITAL ENCOUNTER (OUTPATIENT)
Dept: RADIOLOGY | Facility: MEDICAL CENTER | Age: 30
End: 2025-08-27
Attending: NURSE PRACTITIONER
Payer: COMMERCIAL

## 2025-08-27 VITALS
TEMPERATURE: 96.9 F | SYSTOLIC BLOOD PRESSURE: 118 MMHG | HEIGHT: 68 IN | OXYGEN SATURATION: 98 % | WEIGHT: 188.49 LBS | HEART RATE: 83 BPM | DIASTOLIC BLOOD PRESSURE: 66 MMHG | BODY MASS INDEX: 28.57 KG/M2

## 2025-08-27 DIAGNOSIS — E78.1 HYPERTRIGLYCERIDEMIA: ICD-10-CM

## 2025-08-27 DIAGNOSIS — R29.898 POPPING OF LEFT KNEE JOINT: ICD-10-CM

## 2025-08-27 DIAGNOSIS — Z00.00 PE (PHYSICAL EXAM), ANNUAL: ICD-10-CM

## 2025-08-27 DIAGNOSIS — M25.562 CHRONIC PAIN OF LEFT KNEE: ICD-10-CM

## 2025-08-27 DIAGNOSIS — G89.29 CHRONIC PAIN OF LEFT KNEE: ICD-10-CM

## 2025-08-27 DIAGNOSIS — R09.89 PHLEGM IN THROAT: ICD-10-CM

## 2025-08-27 DIAGNOSIS — R06.83 SNORING: ICD-10-CM

## 2025-08-27 DIAGNOSIS — H61.23 BILATERAL IMPACTED CERUMEN: ICD-10-CM

## 2025-08-27 DIAGNOSIS — R29.898 POPPING OF LEFT KNEE JOINT: Primary | ICD-10-CM

## 2025-08-27 LAB
25(OH)D3 SERPL-MCNC: 21 NG/ML (ref 30–100)
ALBUMIN SERPL BCP-MCNC: 4.5 G/DL (ref 3.2–4.9)
ALBUMIN/GLOB SERPL: 1.6 G/DL
ALP SERPL-CCNC: 95 U/L (ref 30–99)
ALT SERPL-CCNC: 61 U/L (ref 2–50)
ANION GAP SERPL CALC-SCNC: 13 MMOL/L (ref 7–16)
AST SERPL-CCNC: 33 U/L (ref 12–45)
BILIRUB SERPL-MCNC: 0.5 MG/DL (ref 0.1–1.5)
BUN SERPL-MCNC: 13 MG/DL (ref 8–22)
CALCIUM ALBUM COR SERPL-MCNC: 9.1 MG/DL (ref 8.5–10.5)
CALCIUM SERPL-MCNC: 9.5 MG/DL (ref 8.5–10.5)
CHLORIDE SERPL-SCNC: 106 MMOL/L (ref 96–112)
CHOLEST SERPL-MCNC: 183 MG/DL (ref 100–199)
CO2 SERPL-SCNC: 21 MMOL/L (ref 20–33)
CREAT SERPL-MCNC: 1.1 MG/DL (ref 0.5–1.4)
ERYTHROCYTE [DISTWIDTH] IN BLOOD BY AUTOMATED COUNT: 42.6 FL (ref 35.9–50)
EST. AVERAGE GLUCOSE BLD GHB EST-MCNC: 111 MG/DL
FASTING STATUS PATIENT QL REPORTED: NORMAL
GFR SERPLBLD CREATININE-BSD FMLA CKD-EPI: 92 ML/MIN/1.73 M 2
GLOBULIN SER CALC-MCNC: 2.8 G/DL (ref 1.9–3.5)
GLUCOSE SERPL-MCNC: 98 MG/DL (ref 65–99)
HBA1C MFR BLD: 5.5 % (ref 4–5.6)
HCT VFR BLD AUTO: 44.3 % (ref 42–52)
HDLC SERPL-MCNC: 34 MG/DL
HGB BLD-MCNC: 15.3 G/DL (ref 14–18)
LDLC SERPL CALC-MCNC: 111 MG/DL
MCH RBC QN AUTO: 31 PG (ref 27–33)
MCHC RBC AUTO-ENTMCNC: 34.5 G/DL (ref 32.3–36.5)
MCV RBC AUTO: 89.9 FL (ref 81.4–97.8)
PLATELET # BLD AUTO: 391 K/UL (ref 164–446)
PMV BLD AUTO: 8.9 FL (ref 9–12.9)
POTASSIUM SERPL-SCNC: 4.2 MMOL/L (ref 3.6–5.5)
PROT SERPL-MCNC: 7.3 G/DL (ref 6–8.2)
RBC # BLD AUTO: 4.93 M/UL (ref 4.7–6.1)
SODIUM SERPL-SCNC: 140 MMOL/L (ref 135–145)
TRIGL SERPL-MCNC: 191 MG/DL (ref 0–149)
TSH SERPL DL<=0.005 MIU/L-ACNC: 1.84 UIU/ML (ref 0.38–5.33)
WBC # BLD AUTO: 6.7 K/UL (ref 4.8–10.8)

## 2025-08-27 PROCEDURE — 80053 COMPREHEN METABOLIC PANEL: CPT

## 2025-08-27 PROCEDURE — 82306 VITAMIN D 25 HYDROXY: CPT

## 2025-08-27 PROCEDURE — 36415 COLL VENOUS BLD VENIPUNCTURE: CPT

## 2025-08-27 PROCEDURE — 85027 COMPLETE CBC AUTOMATED: CPT

## 2025-08-27 PROCEDURE — 83695 ASSAY OF LIPOPROTEIN(A): CPT

## 2025-08-27 PROCEDURE — 73562 X-RAY EXAM OF KNEE 3: CPT | Mod: LT

## 2025-08-27 PROCEDURE — 84443 ASSAY THYROID STIM HORMONE: CPT

## 2025-08-27 PROCEDURE — 83036 HEMOGLOBIN GLYCOSYLATED A1C: CPT

## 2025-08-27 PROCEDURE — 82172 ASSAY OF APOLIPOPROTEIN: CPT

## 2025-08-27 PROCEDURE — 80061 LIPID PANEL: CPT

## 2025-08-27 ASSESSMENT — ENCOUNTER SYMPTOMS
SINUS PAIN: 0
BACK PAIN: 0
SENSORY CHANGE: 0
NAUSEA: 0
SPUTUM PRODUCTION: 0
FOCAL WEAKNESS: 0
WEAKNESS: 0
TREMORS: 0
MYALGIAS: 0
BLOOD IN STOOL: 0
SHORTNESS OF BREATH: 0
FLANK PAIN: 0
EYE DISCHARGE: 0
CHILLS: 0
ABDOMINAL PAIN: 0
WHEEZING: 0
COUGH: 0
LOSS OF CONSCIOUSNESS: 0
NERVOUS/ANXIOUS: 0
EYE REDNESS: 0
EYE PAIN: 0
CONSTIPATION: 0
POLYDIPSIA: 0
SPEECH CHANGE: 0
INSOMNIA: 0
SORE THROAT: 0
BRUISES/BLEEDS EASILY: 0
DEPRESSION: 0
FEVER: 0
DIZZINESS: 0
DIARRHEA: 0
VOMITING: 0
WEIGHT LOSS: 0
PALPITATIONS: 0
HEADACHES: 0

## 2025-08-27 ASSESSMENT — PATIENT HEALTH QUESTIONNAIRE - PHQ9: CLINICAL INTERPRETATION OF PHQ2 SCORE: 0

## 2025-08-27 ASSESSMENT — LIFESTYLE VARIABLES
HOW OFTEN DO YOU HAVE A DRINK CONTAINING ALCOHOL: 2-3 TIMES A WEEK
HOW OFTEN DO YOU HAVE SIX OR MORE DRINKS ON ONE OCCASION: WEEKLY
AUDIT-C TOTAL SCORE: 6
HOW MANY STANDARD DRINKS CONTAINING ALCOHOL DO YOU HAVE ON A TYPICAL DAY: 1 OR 2
SKIP TO QUESTIONS 9-10: 0

## 2025-08-27 ASSESSMENT — FIBROSIS 4 INDEX: FIB4 SCORE: 0.31

## 2025-08-28 LAB
APO B100 SERPL-MCNC: 109 MG/DL (ref 66–133)
LPA SERPL-MCNC: 12 MG/DL